# Patient Record
Sex: FEMALE | Race: WHITE
[De-identification: names, ages, dates, MRNs, and addresses within clinical notes are randomized per-mention and may not be internally consistent; named-entity substitution may affect disease eponyms.]

---

## 2018-10-18 ENCOUNTER — HOSPITAL ENCOUNTER (OUTPATIENT)
Dept: HOSPITAL 96 - M.NUC | Age: 53
End: 2018-10-18
Attending: INTERNAL MEDICINE
Payer: COMMERCIAL

## 2018-10-18 DIAGNOSIS — K21.9: Primary | ICD-10-CM

## 2019-09-26 ENCOUNTER — HOSPITAL ENCOUNTER (EMERGENCY)
Dept: HOSPITAL 96 - M.ERS | Age: 54
Discharge: HOME | End: 2019-09-26
Payer: COMMERCIAL

## 2019-09-26 VITALS — WEIGHT: 230.01 LBS | BODY MASS INDEX: 36.96 KG/M2 | HEIGHT: 66 IN

## 2019-09-26 VITALS — DIASTOLIC BLOOD PRESSURE: 74 MMHG | SYSTOLIC BLOOD PRESSURE: 128 MMHG

## 2019-09-26 DIAGNOSIS — Z90.49: ICD-10-CM

## 2019-09-26 DIAGNOSIS — E87.6: Primary | ICD-10-CM

## 2019-09-26 DIAGNOSIS — R20.0: ICD-10-CM

## 2019-09-26 DIAGNOSIS — Z85.118: ICD-10-CM

## 2019-09-26 DIAGNOSIS — F32.9: ICD-10-CM

## 2019-09-26 DIAGNOSIS — Z90.710: ICD-10-CM

## 2019-09-26 DIAGNOSIS — F41.9: ICD-10-CM

## 2019-09-26 DIAGNOSIS — R20.2: ICD-10-CM

## 2019-09-26 LAB
ABSOLUTE BASOPHILS: 0.1 THOU/UL (ref 0–0.2)
ABSOLUTE EOSINOPHILS: 0.1 THOU/UL (ref 0–0.7)
ABSOLUTE MONOCYTES: 0.4 THOU/UL (ref 0–1.2)
ALBUMIN SERPL-MCNC: 3.3 G/DL (ref 3.4–5)
ALP SERPL-CCNC: 150 U/L (ref 46–116)
ALT SERPL-CCNC: 24 U/L (ref 30–65)
ANION GAP SERPL CALC-SCNC: 9 MMOL/L (ref 7–16)
AST SERPL-CCNC: 15 U/L (ref 15–37)
BACTERIA-REFLEX: (no result) /HPF
BASOPHILS NFR BLD AUTO: 0.7 %
BENZODIAZ UR-MCNC: POSITIVE UG/L
BILIRUB SERPL-MCNC: 0.2 MG/DL
BILIRUB UR-MCNC: NEGATIVE MG/DL
BUN SERPL-MCNC: 8 MG/DL (ref 7–18)
CALCIUM SERPL-MCNC: 9 MG/DL (ref 8.5–10.1)
CHLORIDE SERPL-SCNC: 101 MMOL/L (ref 98–107)
CO2 SERPL-SCNC: 27 MMOL/L (ref 21–32)
COLOR UR: YELLOW
CREAT SERPL-MCNC: 1.1 MG/DL (ref 0.6–1.3)
EOSINOPHIL NFR BLD: 1 %
GLUCOSE SERPL-MCNC: 119 MG/DL (ref 70–99)
GRANULOCYTES NFR BLD MANUAL: 73.6 %
HCT VFR BLD CALC: 38.4 % (ref 37–47)
HGB BLD-MCNC: 13.2 GM/DL (ref 12–15)
HYALINE CASTS #/AREA URNS LPF: (no result) /LPF
KETONES UR STRIP-MCNC: NEGATIVE MG/DL
LYMPHOCYTES # BLD: 1.3 THOU/UL (ref 0.8–5.3)
LYMPHOCYTES NFR BLD AUTO: 18.7 %
MAGNESIUM SERPL-MCNC: 1.8 MG/DL (ref 1.8–2.4)
MCH RBC QN AUTO: 30.1 PG (ref 26–34)
MCHC RBC AUTO-ENTMCNC: 34.2 G/DL (ref 28–37)
MCV RBC: 87.9 FL (ref 80–100)
MONOCYTES NFR BLD: 6 %
MPV: 8.3 FL. (ref 7.2–11.1)
MUCUS: (no result) STRN/LPF
NEUTROPHILS # BLD: 5.2 THOU/UL (ref 1.6–8.1)
NUCLEATED RBCS: 0 /100WBC
PLATELET COUNT*: 181 THOU/UL (ref 150–400)
POTASSIUM SERPL-SCNC: 2.9 MMOL/L (ref 3.5–5.1)
PROT SERPL-MCNC: 7.3 G/DL (ref 6.4–8.2)
PROT UR QL STRIP: NEGATIVE
RBC # BLD AUTO: 4.37 MIL/UL (ref 4.2–5)
RBC # UR STRIP: (no result) /UL
RBC #/AREA URNS HPF: (no result) /HPF (ref 0–2)
RDW-CV: 15.6 % (ref 10.5–14.5)
SODIUM SERPL-SCNC: 137 MMOL/L (ref 136–145)
SP GR UR STRIP: 1.02 (ref 1–1.03)
SQUAMOUS: (no result) /LPF (ref 0–3)
THC: POSITIVE
URINE CLARITY: CLEAR
URINE GLUCOSE-RANDOM: NEGATIVE
URINE LEUKOCYTES-REFLEX: NEGATIVE
URINE NITRITE-REFLEX: NEGATIVE
URINE WBC-REFLEX: (no result) /HPF (ref 0–5)
UROBILINOGEN UR STRIP-ACNC: 0.2 E.U./DL (ref 0.2–1)
WBC # BLD AUTO: 7 THOU/UL (ref 4–11)

## 2019-09-27 NOTE — EKG
[place] [address]
Phone:  [phone]                     ELECTROCARDIOGRAM REPORT      
_______________________________________________________________________________
 
Name:       SANTOS MCFARLANE                    Room:                      [facility]#:  [id number]      Account #:      [id number]  
Admission:  19     Attend Phys:                         
Discharge:  19     Date of Birth:  [birth year]  
         Report #: [id number]
    [id number]
_______________________________________________________________________________
THIS REPORT FOR:  //name//                      
 
                         [facility] ED
                                       
Test Date:    2019               Test Time:    17:06:56
Pat Name:     SANTOS MCFARLANE                Department:   
Patient ID:   [id number]            Room:          
Gender:       F                        Technician:   MEGAN
:          1965               Requested By: Carrie Mclean
Order Number: [id number] MD:   South Shi
                                 Measurements
Intervals                              Axis          
Rate:         98                       P:            38
WV:           167                      QRS:          -15
QRSD:         99                       T:            62
QT:           369                                    
QTc:          472                                    
                           Interpretive Statements
Sinus rhythm
Probable left atrial enlargement
Borderline left axis deviation
Low voltage, precordial leads
Abnormal R-wave progression, early transition
No previous ECG available for comparison
 
Electronically Signed On 2019 10:04:49 CDT by South Shi
[email or web address]
 
 
 
 
 
 
 
 
 
 
 
 
 
 
 
 
  <ELECTRONICALLY SIGNED>
                                           By: South Shi MD, [facility]      
  19     1004
D: 19   _____________________________________
T: 19   South Shi MD, [facility]        /EPI

## 2020-06-23 ENCOUNTER — HOSPITAL ENCOUNTER (OUTPATIENT)
Dept: HOSPITAL 96 - M.ERS | Age: 55
Setting detail: OBSERVATION
LOS: 1 days | Discharge: HOME | End: 2020-06-24
Attending: INTERNAL MEDICINE | Admitting: INTERNAL MEDICINE
Payer: COMMERCIAL

## 2020-06-23 VITALS — SYSTOLIC BLOOD PRESSURE: 126 MMHG | DIASTOLIC BLOOD PRESSURE: 78 MMHG

## 2020-06-23 VITALS — DIASTOLIC BLOOD PRESSURE: 71 MMHG | SYSTOLIC BLOOD PRESSURE: 109 MMHG

## 2020-06-23 VITALS — SYSTOLIC BLOOD PRESSURE: 131 MMHG | DIASTOLIC BLOOD PRESSURE: 94 MMHG

## 2020-06-23 VITALS — HEIGHT: 65.98 IN | BODY MASS INDEX: 32.14 KG/M2 | WEIGHT: 200 LBS

## 2020-06-23 DIAGNOSIS — R42: ICD-10-CM

## 2020-06-23 DIAGNOSIS — I49.9: Primary | ICD-10-CM

## 2020-06-23 DIAGNOSIS — F32.9: ICD-10-CM

## 2020-06-23 DIAGNOSIS — I48.91: ICD-10-CM

## 2020-06-23 DIAGNOSIS — F41.9: ICD-10-CM

## 2020-06-23 DIAGNOSIS — I10: ICD-10-CM

## 2020-06-23 LAB
ABSOLUTE BASOPHILS: 0.1 THOU/UL (ref 0–0.2)
ABSOLUTE EOSINOPHILS: 0.1 THOU/UL (ref 0–0.7)
ABSOLUTE MONOCYTES: 0.3 THOU/UL (ref 0–1.2)
ALBUMIN SERPL-MCNC: 3.4 G/DL (ref 3.4–5)
ALP SERPL-CCNC: 108 U/L (ref 46–116)
ALT SERPL-CCNC: 19 U/L (ref 30–65)
ANION GAP SERPL CALC-SCNC: 8 MMOL/L (ref 7–16)
APTT BLD: 25.9 SECONDS (ref 25–31.3)
AST SERPL-CCNC: 15 U/L (ref 15–37)
BACTERIA-REFLEX: (no result) /HPF
BASOPHILS NFR BLD AUTO: 1.2 %
BILIRUB SERPL-MCNC: 0.4 MG/DL
BILIRUB UR-MCNC: NEGATIVE MG/DL
BUN SERPL-MCNC: 11 MG/DL (ref 7–18)
CALCIUM SERPL-MCNC: 8.9 MG/DL (ref 8.5–10.1)
CHLORIDE SERPL-SCNC: 102 MMOL/L (ref 98–107)
CO2 SERPL-SCNC: 28 MMOL/L (ref 21–32)
COLOR UR: YELLOW
CREAT SERPL-MCNC: 1 MG/DL (ref 0.6–1.3)
EOSINOPHIL NFR BLD: 2 %
GLUCOSE SERPL-MCNC: 114 MG/DL (ref 70–99)
GRANULOCYTES NFR BLD MANUAL: 53.7 %
HCT VFR BLD CALC: 44.6 % (ref 37–47)
HGB BLD-MCNC: 15.6 GM/DL (ref 12–15)
INR PPP: 1
KETONES UR STRIP-MCNC: NEGATIVE MG/DL
LYMPHOCYTES # BLD: 1.6 THOU/UL (ref 0.8–5.3)
LYMPHOCYTES NFR BLD AUTO: 36.4 %
MCH RBC QN AUTO: 35.2 PG (ref 26–34)
MCHC RBC AUTO-ENTMCNC: 34.9 G/DL (ref 28–37)
MCV RBC: 100.6 FL (ref 80–100)
MONOCYTES NFR BLD: 6.7 %
MPV: 8.2 FL. (ref 7.2–11.1)
NEUTROPHILS # BLD: 2.4 THOU/UL (ref 1.6–8.1)
NT-PRO BRAIN NAT PEPTIDE: 40 PG/ML (ref ?–300)
NUCLEATED RBCS: 0 /100WBC
PLATELET COUNT*: 136 THOU/UL (ref 150–400)
POTASSIUM SERPL-SCNC: 3 MMOL/L (ref 3.5–5.1)
PROT SERPL-MCNC: 6.7 G/DL (ref 6.4–8.2)
PROT UR QL STRIP: NEGATIVE
PROTHROMBIN TIME: 10.7 SECONDS (ref 9.2–11.5)
RBC # BLD AUTO: 4.43 MIL/UL (ref 4.2–5)
RBC # UR STRIP: (no result) /UL
RBC #/AREA URNS HPF: (no result) /HPF (ref 0–2)
RDW-CV: 15.8 % (ref 10.5–14.5)
SODIUM SERPL-SCNC: 138 MMOL/L (ref 136–145)
SP GR UR STRIP: 1.02 (ref 1–1.03)
SQUAMOUS: (no result) /LPF (ref 0–3)
URINE CLARITY: CLEAR
URINE GLUCOSE-RANDOM: NEGATIVE
URINE LEUKOCYTES-REFLEX: NEGATIVE
URINE NITRITE-REFLEX: NEGATIVE
URINE WBC-REFLEX: (no result) /HPF (ref 0–5)
UROBILINOGEN UR STRIP-ACNC: 1 E.U./DL (ref 0.2–1)
WBC # BLD AUTO: 4.4 THOU/UL (ref 4–11)

## 2020-06-24 VITALS — DIASTOLIC BLOOD PRESSURE: 73 MMHG | SYSTOLIC BLOOD PRESSURE: 114 MMHG

## 2020-06-24 VITALS — DIASTOLIC BLOOD PRESSURE: 84 MMHG | SYSTOLIC BLOOD PRESSURE: 127 MMHG

## 2020-06-24 VITALS — DIASTOLIC BLOOD PRESSURE: 67 MMHG | SYSTOLIC BLOOD PRESSURE: 116 MMHG

## 2020-06-24 VITALS — DIASTOLIC BLOOD PRESSURE: 74 MMHG | SYSTOLIC BLOOD PRESSURE: 105 MMHG

## 2020-06-24 VITALS — DIASTOLIC BLOOD PRESSURE: 76 MMHG | SYSTOLIC BLOOD PRESSURE: 130 MMHG

## 2020-06-24 VITALS — DIASTOLIC BLOOD PRESSURE: 51 MMHG | SYSTOLIC BLOOD PRESSURE: 73 MMHG

## 2020-06-24 VITALS — SYSTOLIC BLOOD PRESSURE: 105 MMHG | DIASTOLIC BLOOD PRESSURE: 81 MMHG

## 2020-06-24 VITALS — SYSTOLIC BLOOD PRESSURE: 118 MMHG | DIASTOLIC BLOOD PRESSURE: 74 MMHG

## 2020-06-24 LAB
ABSOLUTE BASOPHILS: 0 THOU/UL (ref 0–0.2)
ABSOLUTE EOSINOPHILS: 0 THOU/UL (ref 0–0.7)
ABSOLUTE MONOCYTES: 0.4 THOU/UL (ref 0–1.2)
ALBUMIN SERPL-MCNC: 3.2 G/DL (ref 3.4–5)
ALP SERPL-CCNC: 104 U/L (ref 46–116)
ALT SERPL-CCNC: 20 U/L (ref 30–65)
ANION GAP SERPL CALC-SCNC: 7 MMOL/L (ref 7–16)
AST SERPL-CCNC: 18 U/L (ref 15–37)
BASOPHILS NFR BLD AUTO: 0.7 %
BILIRUB SERPL-MCNC: 0.5 MG/DL
BUN SERPL-MCNC: 11 MG/DL (ref 7–18)
CALCIUM SERPL-MCNC: 8.3 MG/DL (ref 8.5–10.1)
CHLORIDE SERPL-SCNC: 105 MMOL/L (ref 98–107)
CO2 SERPL-SCNC: 28 MMOL/L (ref 21–32)
CREAT SERPL-MCNC: 0.9 MG/DL (ref 0.6–1.3)
EOSINOPHIL NFR BLD: 1.2 %
GLUCOSE SERPL-MCNC: 103 MG/DL (ref 70–99)
GRANULOCYTES NFR BLD MANUAL: 62.9 %
HCT VFR BLD CALC: 41 % (ref 37–47)
HGB BLD-MCNC: 14.2 GM/DL (ref 12–15)
LYMPHOCYTES # BLD: 1.1 THOU/UL (ref 0.8–5.3)
LYMPHOCYTES NFR BLD AUTO: 25.8 %
MCH RBC QN AUTO: 34.8 PG (ref 26–34)
MCHC RBC AUTO-ENTMCNC: 34.7 G/DL (ref 28–37)
MCV RBC: 100.3 FL (ref 80–100)
MONOCYTES NFR BLD: 9.4 %
MPV: 7.6 FL. (ref 7.2–11.1)
NEUTROPHILS # BLD: 2.6 THOU/UL (ref 1.6–8.1)
NUCLEATED RBCS: 0 /100WBC
PLATELET COUNT*: 118 THOU/UL (ref 150–400)
POTASSIUM SERPL-SCNC: 3.2 MMOL/L (ref 3.5–5.1)
PROT SERPL-MCNC: 6.3 G/DL (ref 6.4–8.2)
RBC # BLD AUTO: 4.09 MIL/UL (ref 4.2–5)
RDW-CV: 15.7 % (ref 10.5–14.5)
SODIUM SERPL-SCNC: 140 MMOL/L (ref 136–145)
WBC # BLD AUTO: 4.1 THOU/UL (ref 4–11)

## 2020-06-24 NOTE — NUR
PT.HAD PHYSICAL THERAPY. THEY RECOMMENDED A FRONT WHEEL WALKER FOR HOME.
PHU/PROVIDER PLUS [employer]. ORDER OBTAINED AND FAXED TO HER. ORIGINAL COPY
FOR [employer] TO DISPENSE A WALKER FROM THEIR CONSIGNMENT CLOSET, IN FRONT OF
CHART.

## 2020-06-24 NOTE — NUR
ASSUMED CARE OF PT AT 2200 FROM THE ER. PT IS ALERT AND ORIENTED. VSS. PERRLA.
NO COMPLAINTS OF PAIN. STEADY GAIT. PT IS IN SINUS RYTHM ON THE TELEMETRY. PT
IS RESTING COMFORTABLY IN BED. RESPIRATIONS ARE EVEN AND NONLABORED. WILL
CONTINUE TO MONITOR PT.

## 2020-06-24 NOTE — 2DMMODE
[place] [address]
Phone:  [phone] 2 D/M-MODE ECHOCARDIOGRAM     
_______________________________________________________________________________
 
Name:         JASMINA MCFARLANEMITZI CARTAGENA                   Room:          [address]
[facility]#:    [id number]     Account #:     [id number]  
Admission:    20    Attend Phys:   Alyssa Peacock, 
Discharge:                Date of Birth: [birth year]  
Date of Service: 20 1341  Report #:      [id number]
        [id number]
_______________________________________________________________________________
THIS REPORT FOR:
 
cc:  Liane Durand,Liane Mcclain,Hans REYNA MD [facility]       
                                                                       ~
 
--------------- APPROVED REPORT --------------
 
 
Study performed:  2020 10:06:59
 
EXAM: Comprehensive 2D, Doppler, and color-flow 
Echocardiogram 
Patient Location: In-Patient   
 
      BSA:         2.00
HR: 94 bpm BP:          114/73 mmHg 
 
Other Information 
Study Quality: Fair
 
Indications
Atrial Fibrillation
 
2D Dimensions
IVSd:  10.63 (7-11mm) LVOT Diam:  19.92 (18-24mm) 
LVDd:  44.24 mm  
PWd:  11.12 (7-11mm) Ascending Ao:  25.64 (22-36mm)
LVDs:  35.22 (25-40mm) 
Aortic Root:  24.91 mm 
 
Volumes
Left Atrial Volume (Systole) 
    LA ESV Index:  19.60 mL/m2
 
Aortic Valve
AoV Peak Manoj.:  1.06 m/s 
AO Peak Gr.:  4.48 mmHg  LVOT Max P.73 mmHg
AO Mean Gr.:  2.43 mmHg  LVOT Mean P.67 mmHg
    LVOT Max V:  0.83 m/s
AO V2 VTI:  20.86 cm  LVOT Mean V:  0.61 m/s
MARY (VTI):  3.10 cm2  LVOT V1 VTI:  20.72 cm
 
Mitral Valve
    E/A Ratio:  1.15
 
 
[place] [address]
Phone:  [phone]                     2 D/M-MODE ECHOCARDIOGRAM     
_______________________________________________________________________________
 
Name:         SANTOS MCFARLANE                   Room:          [address]#:    [id number]     Account #:     [id number]  
Admission:    20    Attend Phys:   Alyssa Peacock, 
Discharge:                Date of Birth: [birth year]  
Date of Service: 20 1341  Report #:      [id number]
        [id number]
_______________________________________________________________________________
    MV Decel. Time:  206.16 ms
MV E Max Manoj.:  0.87 m/s 
MV PHT:  59.79 ms  
MVA (PHT):  3.68 cm2  
 
TDI
E/Lateral E':  9.67 E/Medial E':  9.67
   Medial E' Manoj.:  0.09 m/s
   Lateral E' Manoj.:  0.09 m/s
 
Pulmonary Valve
PV Peak Manoj.:  0.77 m/s PV Peak Gr.:  2.35 mmHg
 
Tricuspid Valve
   RAP Estimate:  5.00 mmHg
TR Peak Gr.:  6.11 mmHg RVSP:  11.11 mmHg
   PA Pressure:  11.11 mmHg
 
Left Ventricle
The left ventricle is normal size. There is normal LV segmental wall 
motion. There is normal left ventricular wall thickness. Left 
ventricular systolic function is normal. The left ventricular 
ejection fraction is within the normal range. LVEF is 60-65%. The 
left ventricular diastolic function is normal.
 
Right Ventricle
The right ventricle is normal size. The right ventricular systolic 
function is normal.
 
Atria
The left atrium size is normal. The right atrium size is 
normal.
 
Aortic Valve
The aortic valve is normal in structure. No aortic regurgitation is 
present. There is no aortic valvular stenosis.
 
Mitral Valve
The mitral valve is normal in structure. There is no mitral valve 
regurgitation noted. No evidence of mitral valve stenosis.
 
Tricuspid Valve
The tricuspid valve is normal in structure. Trace tricuspid 
regurgitation.
 
Pulmonic Valve
 
 
[place] [address]
Phone:  [phone]                     2 D/M-MODE ECHOCARDIOGRAM     
_______________________________________________________________________________
 
Name:         SANTOS MCFARLANE                   Room:          [address]#:    [id number]     Account #:     [id number]  
Admission:    20    Attend Phys:   Alyssa Peacock, 
Discharge:                Date of Birth: [birth year]  
Date of Service: 20 1341  Report #:      [id number]
        [id number]
_______________________________________________________________________________
The pulmonary valve is normal in structure. There is no pulmonic 
valvular regurgitation.
 
Great Vessels
The aortic root is normal in size. IVC is normal in size and 
collapses >50% with inspiration.
 
Pericardium
There is no pericardial effusion.
 
<Conclusion>
The left ventricle is normal size.
There is normal left ventricular wall thickness.
Left ventricular systolic function is normal.
The left ventricular ejection fraction is within the normal 
range.
LVEF is 60-65%.
The left ventricular diastolic function is normal.
The right ventricle is normal size.
The left atrium size is normal.
The aortic valve is normal in structure.
The mitral valve is normal in structure.
The tricuspid valve is normal in structure.
IVC is normal in size and collapses >50% with inspiration.
There is no pericardial effusion.
There is normal LV segmental wall motion.
 
 
 
 
 
 
 
 
 
 
 
 
 
 
 
 
 
 
  <ELECTRONICALLY SIGNED>
                                           By: Hans Watts MD, FACC     
  20     1341
D: 20 1341   _____________________________________
T: 20 1341   Hans Watts MD, FACC       /INF

## 2020-06-24 NOTE — NUR
ASSUMED CARE OF PT AROUND 0730 THIS AM. REFER TO ASSESSMENT. PT IN SR THIS
SHIFT. ECHO OBTAINED. PT GIVEN HEART MONITOR FROM CARDIOLOGY TO TAKE HOME WITH
HER. PT GIVEN IV BOLUS X2 TO CORRECT ORTHOSTATIC HYPOTENSION. PT GIVEN BENITA
HOSE TO WEAR AT HOME. OK WITH DISCHARGE FROM CARDIOLOGY AND HOSPITALIST. PT
GIVEN DC INSTRUCTIONS AND VERBALIZES UNDERSTANDING. AWAITING US CAROTIDS
BEFORE DC. NO OTHER CONCERNS AT THIS TIME. CLWR. WCTM.

## 2020-06-25 NOTE — EKG
[place] [address]
Phone:  [phone]                     ELECTROCARDIOGRAM REPORT      
_______________________________________________________________________________
 
Name:         SANTOS MCFARLANE                   Room:          [address]#:    [id number]     Account #:     [id number]  
Admission:    20    Attend Phys:   Alyssa Peacock, 
Discharge:    20    Date of Birth: [birth year]  
Date of Service: 20 1701  Report #:      [id number]
        [id number]
_______________________________________________________________________________
THIS REPORT FOR:  //name//                      
 
                         [facility] ED
                                       
Test Date:    2020               Test Time:    17:01:19
Pat Name:     SANTOS MCFARLANE                Department:   
Patient ID:   [id number]            Room:         [facility]
Gender:       F                        Technician:   CCD
:          1965               Requested By: Timothy Taveras
Order Number: [id number] MD:   Juan Flynn
                                 Measurements
Intervals                              Axis          
Rate:         82                       P:            
HI:                                    QRS:          -29
QRSD:         120                      T:            62
QT:           396                                    
QTc:          463                                    
                           Interpretive Statements
Sinus rhythm with premature atrial contractions
Baseline wander in lead(s) II,aVR,aVF
Compared to ECG 2019 17:06:56
Premature atrial contractions now present
Electronically Signed On 2020 8:38:00 CDT by Juan Flynn
[email or web address]
 
 
 
 
 
 
 
 
 
 
 
 
 
 
 
 
 
 
 
 
  <ELECTRONICALLY SIGNED>
                                           By: Juan Flynn MD, FACC   
  20     0838
D: 20 170   _____________________________________
T: 20   Juan Flynn MD, FACC     /EPI

## 2020-06-25 NOTE — EKG
[place] [address]
Phone:  [phone]                     ELECTROCARDIOGRAM REPORT      
_______________________________________________________________________________
 
Name:         SANTOS MCFARLANE                   Room:          [address]#:    [id number]     Account #:     [id number]  
Admission:    20    Attend Phys:   Alyssa Peacock, 
Discharge:    20    Date of Birth: [birth year]  
Date of Service: 20  Report #:      [id number]
        [id number]
_______________________________________________________________________________
THIS REPORT FOR:  //name//                      
 
                         [facility] ED
                                       
Test Date:    2020               Test Time:    17:42:31
Pat Name:     SANTOS MCFARLANE                Department:   
Patient ID:   [id number]            Room:         [facility]
Gender:       F                        Technician:   CCD
:          1965               Requested By: Timothy Taveras
Order Number: [id number] MD:   Juan Flynn
                                 Measurements
Intervals                              Axis          
Rate:         81                       P:            
IL:                                    QRS:          -19
QRSD:         108                      T:            50
QT:           413                                    
QTc:          480                                    
                           Interpretive Statements
Sinus rhythm with premature atrial contractions borderline prolonged QT
interval
Compared to ECG 2020 17:01:19
No significant changes noted
Electronically Signed On 2020 8:38:26 CDT by Juan Flynn
[email or web address]
 
 
 
 
 
 
 
 
 
 
 
 
 
 
 
 
 
 
 
 
  <ELECTRONICALLY SIGNED>
                                           By: Juan Flynn MD, FAC   
  20     0838
D: 20 1742   _____________________________________
T: 20 1742   Juan Flynn MD, [facility]     /EPI

## 2020-06-29 ENCOUNTER — HOSPITAL ENCOUNTER (OUTPATIENT)
Dept: HOSPITAL 96 - M.ERS | Age: 55
Setting detail: OBSERVATION
LOS: 2 days | Discharge: HOME | End: 2020-07-01
Attending: FAMILY MEDICINE | Admitting: FAMILY MEDICINE
Payer: COMMERCIAL

## 2020-06-29 VITALS — HEIGHT: 65.98 IN | WEIGHT: 190 LBS | BODY MASS INDEX: 30.53 KG/M2

## 2020-06-29 VITALS — SYSTOLIC BLOOD PRESSURE: 162 MMHG | DIASTOLIC BLOOD PRESSURE: 100 MMHG

## 2020-06-29 DIAGNOSIS — C34.91: ICD-10-CM

## 2020-06-29 DIAGNOSIS — F41.9: ICD-10-CM

## 2020-06-29 DIAGNOSIS — E83.39: ICD-10-CM

## 2020-06-29 DIAGNOSIS — J44.9: ICD-10-CM

## 2020-06-29 DIAGNOSIS — E83.51: ICD-10-CM

## 2020-06-29 DIAGNOSIS — Z87.891: ICD-10-CM

## 2020-06-29 DIAGNOSIS — B37.9: ICD-10-CM

## 2020-06-29 DIAGNOSIS — E83.42: ICD-10-CM

## 2020-06-29 DIAGNOSIS — Z90.49: ICD-10-CM

## 2020-06-29 DIAGNOSIS — G89.29: ICD-10-CM

## 2020-06-29 DIAGNOSIS — M54.9: ICD-10-CM

## 2020-06-29 DIAGNOSIS — I95.1: ICD-10-CM

## 2020-06-29 DIAGNOSIS — E87.6: Primary | ICD-10-CM

## 2020-06-29 DIAGNOSIS — Z79.899: ICD-10-CM

## 2020-06-29 LAB
ABSOLUTE BASOPHILS: 0 THOU/UL (ref 0–0.2)
ABSOLUTE EOSINOPHILS: 0 THOU/UL (ref 0–0.7)
ABSOLUTE MONOCYTES: 0.6 THOU/UL (ref 0–1.2)
ALBUMIN SERPL-MCNC: 3.1 G/DL (ref 3.4–5)
ALP SERPL-CCNC: 110 U/L (ref 46–116)
ALT SERPL-CCNC: 15 U/L (ref 30–65)
ANION GAP SERPL CALC-SCNC: 6 MMOL/L (ref 7–16)
AST SERPL-CCNC: 12 U/L (ref 15–37)
BACTERIA-REFLEX: (no result) /HPF
BASOPHILS NFR BLD AUTO: 0.5 %
BILIRUB SERPL-MCNC: 0.5 MG/DL
BILIRUB UR-MCNC: NEGATIVE MG/DL
BUN SERPL-MCNC: 7 MG/DL (ref 7–18)
CALCIUM SERPL-MCNC: 8.8 MG/DL (ref 8.5–10.1)
CHLORIDE SERPL-SCNC: 103 MMOL/L (ref 98–107)
CO2 SERPL-SCNC: 31 MMOL/L (ref 21–32)
COLOR UR: YELLOW
CREAT SERPL-MCNC: 1 MG/DL (ref 0.6–1.3)
EOSINOPHIL NFR BLD: 0.6 %
GLUCOSE SERPL-MCNC: 133 MG/DL (ref 70–99)
GRANULOCYTES NFR BLD MANUAL: 73.4 %
HCT VFR BLD CALC: 41.1 % (ref 37–47)
HGB BLD-MCNC: 14.2 GM/DL (ref 12–15)
KETONES UR STRIP-MCNC: NEGATIVE MG/DL
LYMPHOCYTES # BLD: 1 THOU/UL (ref 0.8–5.3)
LYMPHOCYTES NFR BLD AUTO: 16.1 %
MAGNESIUM SERPL-MCNC: 1.4 MG/DL (ref 1.8–2.4)
MCH RBC QN AUTO: 35.2 PG (ref 26–34)
MCHC RBC AUTO-ENTMCNC: 34.6 G/DL (ref 28–37)
MCV RBC: 101.7 FL (ref 80–100)
MONOCYTES NFR BLD: 9.4 %
MPV: 8.3 FL. (ref 7.2–11.1)
NEUTROPHILS # BLD: 4.4 THOU/UL (ref 1.6–8.1)
NUCLEATED RBCS: 0 /100WBC
PLATELET COUNT*: 109 THOU/UL (ref 150–400)
POTASSIUM SERPL-SCNC: 2.9 MMOL/L (ref 3.5–5.1)
PROT SERPL-MCNC: 6.4 G/DL (ref 6.4–8.2)
PROT UR QL STRIP: NEGATIVE
RBC # BLD AUTO: 4.04 MIL/UL (ref 4.2–5)
RBC # UR STRIP: (no result) /UL
RBC #/AREA URNS HPF: (no result) /HPF (ref 0–2)
RDW-CV: 15.7 % (ref 10.5–14.5)
SODIUM SERPL-SCNC: 140 MMOL/L (ref 136–145)
SP GR UR STRIP: 1.02 (ref 1–1.03)
SQUAMOUS: (no result) /LPF (ref 0–3)
URINE CLARITY: CLEAR
URINE GLUCOSE-RANDOM: NEGATIVE
URINE LEUKOCYTES-REFLEX: NEGATIVE
URINE NITRITE-REFLEX: NEGATIVE
URINE WBC-REFLEX: (no result) /HPF (ref 0–5)
UROBILINOGEN UR STRIP-ACNC: 1 E.U./DL (ref 0.2–1)
WBC # BLD AUTO: 6 THOU/UL (ref 4–11)

## 2020-06-30 VITALS — SYSTOLIC BLOOD PRESSURE: 129 MMHG | DIASTOLIC BLOOD PRESSURE: 80 MMHG

## 2020-06-30 VITALS — SYSTOLIC BLOOD PRESSURE: 113 MMHG | DIASTOLIC BLOOD PRESSURE: 72 MMHG

## 2020-06-30 VITALS — DIASTOLIC BLOOD PRESSURE: 77 MMHG | SYSTOLIC BLOOD PRESSURE: 122 MMHG

## 2020-06-30 VITALS — SYSTOLIC BLOOD PRESSURE: 122 MMHG | DIASTOLIC BLOOD PRESSURE: 77 MMHG

## 2020-06-30 VITALS — SYSTOLIC BLOOD PRESSURE: 152 MMHG | DIASTOLIC BLOOD PRESSURE: 90 MMHG

## 2020-06-30 VITALS — DIASTOLIC BLOOD PRESSURE: 79 MMHG | SYSTOLIC BLOOD PRESSURE: 138 MMHG

## 2020-06-30 VITALS — SYSTOLIC BLOOD PRESSURE: 114 MMHG | DIASTOLIC BLOOD PRESSURE: 79 MMHG

## 2020-06-30 LAB
ANION GAP SERPL CALC-SCNC: 7 MMOL/L (ref 7–16)
BUN SERPL-MCNC: 6 MG/DL (ref 7–18)
CALCIUM SERPL-MCNC: 7.9 MG/DL (ref 8.5–10.1)
CHLORIDE SERPL-SCNC: 109 MMOL/L (ref 98–107)
CO2 SERPL-SCNC: 26 MMOL/L (ref 21–32)
CREAT SERPL-MCNC: 0.7 MG/DL (ref 0.6–1.3)
GLUCOSE SERPL-MCNC: 92 MG/DL (ref 70–99)
MAGNESIUM SERPL-MCNC: 1.4 MG/DL (ref 1.8–2.4)
POTASSIUM SERPL-SCNC: 3.1 MMOL/L (ref 3.5–5.1)
SODIUM SERPL-SCNC: 142 MMOL/L (ref 136–145)
URINE CHLORIDE-RANDOM*: 170 MMOL/L
URINE POTASSIUM-RANDOM: 36.8 MMOL/L

## 2020-06-30 NOTE — EKG
[place] [address]
Phone:  [phone]                     ELECTROCARDIOGRAM REPORT      
_______________________________________________________________________________
 
Name:         MAEVEJASMINAMITZI CARTAGENA                   Room:          [address]#:    [id number]     Account #:     [id number]  
Admission:    20    Attend Phys:   Getachew crews Sa
Discharge:                Date of Birth: [birth year]  
Date of Service: 20  Report #:      [id number]
        [id number]
_______________________________________________________________________________
THIS REPORT FOR:  //name//                      
 
                         [facility] ED
                                       
Test Date:    2020               Test Time:    19:16:45
Pat Name:     SANTOS MCFARLANE                Department:   
Patient ID:   [id number]            Room:         [facility]
Gender:       F                        Technician:   FL
:          1965               Requested By: Carrie Mclean
Order Number: [id number] MD:   Juan Flynn
                                 Measurements
Intervals                              Axis          
Rate:         105                      P:            58
TN:           164                      QRS:          -16
QRSD:         110                      T:            84
QT:           364                                    
QTc:          482                                    
                           Interpretive Statements
Sinus tachycardia
Borderline left axis deviation
Compared to ECG 2020 17:42:31
No significant changes
Electronically Signed On 2020 9:07:29 CDT by Juan Flynn
[email or web address]
 
 
 
 
 
 
 
 
 
 
 
 
 
 
 
 
 
 
 
 
  <ELECTRONICALLY SIGNED>
                                           By: Juan Flynn MD, FACC   
  20
D: 20   _____________________________________
T: 20   Juan Flynn MD, [facility]     /EPI

## 2020-07-01 VITALS — SYSTOLIC BLOOD PRESSURE: 126 MMHG | DIASTOLIC BLOOD PRESSURE: 78 MMHG

## 2020-07-01 VITALS — DIASTOLIC BLOOD PRESSURE: 88 MMHG | SYSTOLIC BLOOD PRESSURE: 136 MMHG

## 2020-07-01 VITALS — DIASTOLIC BLOOD PRESSURE: 67 MMHG | SYSTOLIC BLOOD PRESSURE: 101 MMHG

## 2020-07-01 VITALS — SYSTOLIC BLOOD PRESSURE: 134 MMHG | DIASTOLIC BLOOD PRESSURE: 91 MMHG

## 2020-07-01 VITALS — DIASTOLIC BLOOD PRESSURE: 50 MMHG | SYSTOLIC BLOOD PRESSURE: 114 MMHG

## 2020-07-01 VITALS — SYSTOLIC BLOOD PRESSURE: 136 MMHG | DIASTOLIC BLOOD PRESSURE: 85 MMHG

## 2020-07-01 VITALS — SYSTOLIC BLOOD PRESSURE: 142 MMHG | DIASTOLIC BLOOD PRESSURE: 84 MMHG

## 2020-07-01 VITALS — DIASTOLIC BLOOD PRESSURE: 85 MMHG | SYSTOLIC BLOOD PRESSURE: 126 MMHG

## 2020-07-01 VITALS — SYSTOLIC BLOOD PRESSURE: 129 MMHG | DIASTOLIC BLOOD PRESSURE: 87 MMHG

## 2020-07-01 LAB
ANION GAP SERPL CALC-SCNC: 6 MMOL/L (ref 7–16)
BUN SERPL-MCNC: 5 MG/DL (ref 7–18)
CALCIUM SERPL-MCNC: 8 MG/DL (ref 8.5–10.1)
CHLORIDE SERPL-SCNC: 109 MMOL/L (ref 98–107)
CO2 SERPL-SCNC: 27 MMOL/L (ref 21–32)
CREAT SERPL-MCNC: 0.7 MG/DL (ref 0.6–1.3)
GLUCOSE SERPL-MCNC: 92 MG/DL (ref 70–99)
MAGNESIUM SERPL-MCNC: 1.5 MG/DL (ref 1.8–2.4)
PHOSPHATE SERPL-MCNC: 2.4 MG/DL (ref 2.5–4.9)
POTASSIUM SERPL-SCNC: 3.3 MMOL/L (ref 3.5–5.1)
SODIUM SERPL-SCNC: 142 MMOL/L (ref 136–145)

## 2020-07-03 NOTE — CON
[address]  [address]                    CONSULTATION                  
_______________________________________________________________________________
 
Name:       SANTOS MCFARLANE                    Room:           [address] [place]#:  [id number]      Account #:      [id number]  
Admission:  06/29/20     Attend Phys:    Getachew Mock
Discharge:  07/01/20     Date of Birth:  [birth year]  
         Report #: [id number]
                                                                     [id number]  
_______________________________________________________________________________
THIS REPORT FOR:  //name//                      
 
cc:  Liane Durand Anna S. DO                                                    ~
THIS REPORT FOR:  //name//                      
 
CC: Liane Mo
 
DATE OF SERVICE:  07/01/2020
 
 
REQUESTING PHYSICIAN:  Jesús Walden DO.
 
REASON FOR CONSULTATION:  Hypokalemia, which is a recurrent problem.
 
HISTORY OF PRESENT ILLNESS:  The patient is a 55-year-old female with medical
history significant for orthostatic hypotension, chronic atrial fibrillation,
depression, history of COPD, history of lung cancer, restless legs syndrome, she
presents with complaints of weakness.  She was found to have recurrent syncopal
episode and recurrent hypokalemia.  Potassium is around 2.9-3.0.
 
MEDICATIONS:  At home reviewed.  She is not on any medication that can cause
hypokalemia.
 
FAMILY HISTORY:  Negative for hypokalemia.
 
SOCIAL HISTORY:  Former user of tobacco, current use of marijuana.  No alcohol
abuse.
 
REVIEW OF SYSTEMS:  Positive for overall weakness, poor appetite, depression,
frequent dizziness.
 
PAST SURGICAL HISTORY:  Partial right pneumonectomy due to lung cancer.  There
is no evidence of residual mass or pneumonia or pneumothorax.
 
PHYSICAL EXAMINATION:
GENERAL:  Awake, alert, oriented, in no acute distress.
VITAL SIGNS:  Blood pressure 101/67, heart rate is 95, afebrile.
HEENT:  Pupils are round.
NECK:  Fatty.
LUNGS:  Clear.
CARDIOVASCULAR:  Regular rate.
ABDOMEN:  Soft.
LOWER EXTREMITIES:  No edema.
 
 
 
 
[place] [address]                    CONSULTATION                  
_______________________________________________________________________________
 
Name:       SANTOS MCFARLANE MITZI                    Room:           [address] [place]#:  [id number]      Account #:      [id number]  
Admission:  06/29/20     Attend Phys:    Getachew guzmán los Santo
Discharge:  07/01/20     Date of Birth:  [birth year]  
         Report #: [id number]
                                                                     [id number]  
_______________________________________________________________________________
LABORATORY DATA:  Serum sodium 142, potassium 3.3, chloride 109, carbon dioxide
27, BUN 5, creatinine 0.7, magnesium 1.5, phosphorus 2.4, calcium 8.0.
 
ASSESSMENT:  Hypokalemia, coupled with hypocalcemia, hypophosphatemia and
hypomagnesemia.  Most likely, I think this is due to poor nutrition.  Cannot
rule out loss through the urine.
 
PLAN:  My plan is to collect a 24-hour urine for total potassium.  If her
potassium is high in the urine while it is low in the blood that would point
towards renal potassium waste.  So plan again is a 24-hour urine collection and
will follow on potassium.  Make sure we replace potassium, replace magnesium per
protocol for now.
 
 
 
 
 
 
 
 
 
 
 
 
 
 
 
 
 
 
 
 
 
 
 
 
 
 
 
 
 
 
 
 
<ELECTRONICALLY SIGNED>
                                        By:  Vivek Perkins MD      
07/03/20     1017
D: 07/01/20 1529_______________________________________
T: 07/01/20 1952Alexlivier Perkins MD         /nt

## 2020-07-08 ENCOUNTER — HOSPITAL ENCOUNTER (EMERGENCY)
Dept: HOSPITAL 96 - M.ERS | Age: 55
Discharge: HOME | End: 2020-07-08
Payer: COMMERCIAL

## 2020-07-08 VITALS — DIASTOLIC BLOOD PRESSURE: 69 MMHG | SYSTOLIC BLOOD PRESSURE: 100 MMHG

## 2020-07-08 VITALS — HEIGHT: 66 IN | WEIGHT: 195 LBS | BODY MASS INDEX: 31.34 KG/M2

## 2020-07-08 DIAGNOSIS — Z90.710: ICD-10-CM

## 2020-07-08 DIAGNOSIS — E86.0: Primary | ICD-10-CM

## 2020-07-08 DIAGNOSIS — Z85.118: ICD-10-CM

## 2020-07-08 DIAGNOSIS — Z98.51: ICD-10-CM

## 2020-07-08 DIAGNOSIS — Z90.49: ICD-10-CM

## 2020-07-08 DIAGNOSIS — R33.9: ICD-10-CM

## 2020-07-08 DIAGNOSIS — Z96.652: ICD-10-CM

## 2020-07-08 LAB
BILIRUB UR-MCNC: NEGATIVE MG/DL
COLOR UR: YELLOW
KETONES UR STRIP-MCNC: NEGATIVE MG/DL
PROT UR QL STRIP: (no result)
RBC # UR STRIP: (no result) /UL
SP GR UR STRIP: >= 1.03 (ref 1–1.03)
URINE CLARITY: CLEAR
URINE GLUCOSE-RANDOM: NEGATIVE
URINE LEUKOCYTES-REFLEX: NEGATIVE
URINE NITRITE-REFLEX: NEGATIVE
UROBILINOGEN UR STRIP-ACNC: 1 E.U./DL (ref 0.2–1)

## 2020-07-13 ENCOUNTER — HOSPITAL ENCOUNTER (OUTPATIENT)
Dept: HOSPITAL 96 - M.ERS | Age: 55
Setting detail: OBSERVATION
LOS: 2 days | Discharge: HOME | End: 2020-07-15
Attending: INTERNAL MEDICINE | Admitting: INTERNAL MEDICINE
Payer: COMMERCIAL

## 2020-07-13 VITALS — WEIGHT: 245 LBS | HEIGHT: 67.01 IN | BODY MASS INDEX: 38.45 KG/M2

## 2020-07-13 DIAGNOSIS — F41.1: ICD-10-CM

## 2020-07-13 DIAGNOSIS — G62.9: ICD-10-CM

## 2020-07-13 DIAGNOSIS — Z85.118: ICD-10-CM

## 2020-07-13 DIAGNOSIS — Z03.818: Primary | ICD-10-CM

## 2020-07-13 DIAGNOSIS — J44.9: ICD-10-CM

## 2020-07-13 DIAGNOSIS — G25.81: ICD-10-CM

## 2020-07-13 DIAGNOSIS — G47.00: ICD-10-CM

## 2020-07-13 DIAGNOSIS — E87.6: ICD-10-CM

## 2020-07-13 DIAGNOSIS — R55: ICD-10-CM

## 2020-07-13 DIAGNOSIS — F32.9: ICD-10-CM

## 2020-07-13 DIAGNOSIS — K31.84: ICD-10-CM

## 2020-07-14 VITALS — SYSTOLIC BLOOD PRESSURE: 113 MMHG | DIASTOLIC BLOOD PRESSURE: 81 MMHG

## 2020-07-14 VITALS — DIASTOLIC BLOOD PRESSURE: 82 MMHG | SYSTOLIC BLOOD PRESSURE: 118 MMHG

## 2020-07-14 VITALS — DIASTOLIC BLOOD PRESSURE: 88 MMHG | SYSTOLIC BLOOD PRESSURE: 127 MMHG

## 2020-07-14 VITALS — DIASTOLIC BLOOD PRESSURE: 81 MMHG | SYSTOLIC BLOOD PRESSURE: 138 MMHG

## 2020-07-14 VITALS — DIASTOLIC BLOOD PRESSURE: 76 MMHG | SYSTOLIC BLOOD PRESSURE: 103 MMHG

## 2020-07-14 VITALS — DIASTOLIC BLOOD PRESSURE: 88 MMHG | SYSTOLIC BLOOD PRESSURE: 123 MMHG

## 2020-07-14 VITALS — DIASTOLIC BLOOD PRESSURE: 86 MMHG | SYSTOLIC BLOOD PRESSURE: 129 MMHG

## 2020-07-14 LAB
ABSOLUTE BASOPHILS: 0 THOU/UL (ref 0–0.2)
ABSOLUTE EOSINOPHILS: 0.1 THOU/UL (ref 0–0.7)
ABSOLUTE MONOCYTES: 0.4 THOU/UL (ref 0–1.2)
ALBUMIN SERPL-MCNC: 3 G/DL (ref 3.4–5)
ALP SERPL-CCNC: 147 U/L (ref 46–116)
ALT SERPL-CCNC: 30 U/L (ref 30–65)
ANION GAP SERPL CALC-SCNC: 6 MMOL/L (ref 7–16)
ANION GAP SERPL CALC-SCNC: 6 MMOL/L (ref 7–16)
AST SERPL-CCNC: 32 U/L (ref 15–37)
BASOPHILS NFR BLD AUTO: 0.6 %
BILIRUB SERPL-MCNC: 0.4 MG/DL
BILIRUB UR-MCNC: NEGATIVE MG/DL
BUN SERPL-MCNC: 13 MG/DL (ref 7–18)
BUN SERPL-MCNC: 14 MG/DL (ref 7–18)
CALCIUM SERPL-MCNC: 7.9 MG/DL (ref 8.5–10.1)
CALCIUM SERPL-MCNC: 8.4 MG/DL (ref 8.5–10.1)
CHLORIDE SERPL-SCNC: 100 MMOL/L (ref 98–107)
CHLORIDE SERPL-SCNC: 106 MMOL/L (ref 98–107)
CO2 SERPL-SCNC: 27 MMOL/L (ref 21–32)
CO2 SERPL-SCNC: 28 MMOL/L (ref 21–32)
COLOR UR: YELLOW
CREAT SERPL-MCNC: 0.8 MG/DL (ref 0.6–1.3)
CREAT SERPL-MCNC: 1.1 MG/DL (ref 0.6–1.3)
EOSINOPHIL NFR BLD: 1.5 %
GLUCOSE SERPL-MCNC: 149 MG/DL (ref 70–99)
GLUCOSE SERPL-MCNC: 90 MG/DL (ref 70–99)
GRANULOCYTES NFR BLD MANUAL: 60.6 %
HCT VFR BLD CALC: 42.5 % (ref 37–47)
HGB BLD-MCNC: 14.7 GM/DL (ref 12–15)
KETONES UR STRIP-MCNC: NEGATIVE MG/DL
LYMPHOCYTES # BLD: 1.3 THOU/UL (ref 0.8–5.3)
LYMPHOCYTES NFR BLD AUTO: 28.3 %
MAGNESIUM SERPL-MCNC: 1.8 MG/DL (ref 1.8–2.4)
MAGNESIUM SERPL-MCNC: 2 MG/DL (ref 1.8–2.4)
MCH RBC QN AUTO: 35.1 PG (ref 26–34)
MCHC RBC AUTO-ENTMCNC: 34.7 G/DL (ref 28–37)
MCV RBC: 101.2 FL (ref 80–100)
MONOCYTES NFR BLD: 9 %
MPV: 7.7 FL. (ref 7.2–11.1)
NEUTROPHILS # BLD: 2.8 THOU/UL (ref 1.6–8.1)
NUCLEATED RBCS: 0 /100WBC
PLATELET COUNT*: 128 THOU/UL (ref 150–400)
POTASSIUM SERPL-SCNC: 3.3 MMOL/L (ref 3.5–5.1)
POTASSIUM SERPL-SCNC: 3.6 MMOL/L (ref 3.5–5.1)
PROT SERPL-MCNC: 6.1 G/DL (ref 6.4–8.2)
PROT UR QL STRIP: NEGATIVE
RBC # BLD AUTO: 4.2 MIL/UL (ref 4.2–5)
RBC # UR STRIP: (no result) /UL
RDW-CV: 15.6 % (ref 10.5–14.5)
SODIUM SERPL-SCNC: 133 MMOL/L (ref 136–145)
SODIUM SERPL-SCNC: 140 MMOL/L (ref 136–145)
SP GR UR STRIP: 1.02 (ref 1–1.03)
URINE CLARITY: CLEAR
URINE GLUCOSE-RANDOM: NEGATIVE
URINE LEUKOCYTES-REFLEX: NEGATIVE
URINE NITRITE-REFLEX: NEGATIVE
UROBILINOGEN UR STRIP-ACNC: 1 E.U./DL (ref 0.2–1)
WBC # BLD AUTO: 4.7 THOU/UL (ref 4–11)

## 2020-07-14 NOTE — [place] [address]
Phone:  [phone]                     ELECTROCARDIOGRAM REPORT      
_______________________________________________________________________________
 
Name:         SANTOS MCFARLANE                   Room:          [address]#:    [id number]     Account #:     [id number]  
Admission:    20    Attend Phys:   Alyssa Peacock, 
Discharge:                Date of Birth: [birth year]  
Date of Service: 20 0007  Report #:      [id number]
        [id number]
_______________________________________________________________________________
THIS REPORT FOR:  //name//                      
 
                         [facility] ED
                                       
Test Date:    2020               Test Time:    00:07:38
Pat Name:     SANTOS MCFARLANE                Department:   
Patient ID:   [id number]            Room:         [facility]
Gender:       F                        Technician:   
:          1965               Requested By: Daisy Sexton
Order Number: [id number] MD:   South Shi
                                 Measurements
Intervals                              Axis          
Rate:         126                      P:            45
OH:           153                      QRS:          13
QRSD:         102                      T:            163
QT:           355                                    
QTc:          515                                    
                           Interpretive Statements
Sinus tachycardia
Probable left atrial enlargement
RSR' in V1 or V2, right VCD or RVH
Nonspecific T abnrm, anterolateral leads
Prolonged QT interval
Compared to ECG 2020 19:16:45
 
RSR' in V1 or V2 now present
Prolonged QT interval now present
Electronically Signed On 2020 13:26:49 CDT by South Shi
[email or web address]
 
 
 
 
 
 
 
 
 
 
 
 
 
 
 
  <ELECTRONICALLY SIGNED>
                                           By: South Shi MD, [facility]      
  20     1326
D: 20   _____________________________________
T: 20   South Shi MD, [facility]        /EPI

## 2020-07-14 NOTE — NUR
Pt lives at home with .  Pt has RW.  Pt struggling with anxiety,
medications contributing to symptoms according to the team and nurse says that
she has informed the pt at recent previous admissions.  Psych consulted.  SW
to continue to follow to assist with safe dc planning.

## 2020-07-14 NOTE — NUR
PT ADMITTED TO ROOM 226 AROUND 0825 THIS AM. REFER TO ASSESSMENT. CARDIOLOGY
AND PSYCHOLOGY CONSULTED THIS SHIFT. AWAITING PSYCHOLOGISTS RECOMMENDATIONS AT
THIS TIME. ANTICIPATE TILT TABLE TEST WITH CARDIOLOGY TOMORROW. PT TO BE NPO
AFTER MIDNIGHT. ORTHOSTAT VSS OBTAINED THIS EVENING AND PT POSITIVE. PRN
MIDODRINE ADMINISTERED. US TO CAROTIDS OBTAINED THIS SHIFT. POTASSIUM REPLACED
THIS SHIFT. FALL PRECAUTIONS IN PLACE.  NO OTHER CONCERNS AT THIS TIME. CLWR.
WCTM.

## 2020-07-15 VITALS — SYSTOLIC BLOOD PRESSURE: 124 MMHG | DIASTOLIC BLOOD PRESSURE: 75 MMHG

## 2020-07-15 VITALS — SYSTOLIC BLOOD PRESSURE: 122 MMHG | DIASTOLIC BLOOD PRESSURE: 69 MMHG

## 2020-07-15 VITALS — DIASTOLIC BLOOD PRESSURE: 69 MMHG | SYSTOLIC BLOOD PRESSURE: 122 MMHG

## 2020-07-15 VITALS — DIASTOLIC BLOOD PRESSURE: 55 MMHG | SYSTOLIC BLOOD PRESSURE: 81 MMHG

## 2020-07-15 VITALS — SYSTOLIC BLOOD PRESSURE: 139 MMHG | DIASTOLIC BLOOD PRESSURE: 80 MMHG

## 2020-07-15 VITALS — DIASTOLIC BLOOD PRESSURE: 88 MMHG | SYSTOLIC BLOOD PRESSURE: 137 MMHG

## 2020-07-15 VITALS — DIASTOLIC BLOOD PRESSURE: 85 MMHG | SYSTOLIC BLOOD PRESSURE: 112 MMHG

## 2020-07-15 VITALS — SYSTOLIC BLOOD PRESSURE: 104 MMHG | DIASTOLIC BLOOD PRESSURE: 76 MMHG

## 2020-07-15 VITALS — DIASTOLIC BLOOD PRESSURE: 81 MMHG | SYSTOLIC BLOOD PRESSURE: 132 MMHG

## 2020-07-15 VITALS — DIASTOLIC BLOOD PRESSURE: 66 MMHG | SYSTOLIC BLOOD PRESSURE: 135 MMHG

## 2020-07-15 VITALS — SYSTOLIC BLOOD PRESSURE: 103 MMHG | DIASTOLIC BLOOD PRESSURE: 74 MMHG

## 2020-07-15 VITALS — SYSTOLIC BLOOD PRESSURE: 131 MMHG | DIASTOLIC BLOOD PRESSURE: 88 MMHG

## 2020-07-15 VITALS — DIASTOLIC BLOOD PRESSURE: 84 MMHG | SYSTOLIC BLOOD PRESSURE: 115 MMHG

## 2020-07-15 VITALS — DIASTOLIC BLOOD PRESSURE: 52 MMHG | SYSTOLIC BLOOD PRESSURE: 82 MMHG

## 2020-07-15 VITALS — SYSTOLIC BLOOD PRESSURE: 123 MMHG | DIASTOLIC BLOOD PRESSURE: 90 MMHG

## 2020-07-15 NOTE — NUR
PATIENT NPO FOR TILT TABLE TEST THIS AM, RESULTS SENT TO CARDIOLOGY AND AMANDA HOOPER HERE TO SEE PATIENT. PATIENT OK TO DISCHARGE HOME, FIRST DOSE OF
INCREASED MIDODRINE GIVEN AS ORDERED. DR. STEPHEN NOTIFIED AND OK TO
DISCHARGE. IV DC'D. PATIENT UP WITH ASSISTANCE THIS SHIFT. PRN HYDROCODONE
GIVEN FOR BACK PAIN X 1. ORTHOSTATICS THIS AM WERE GIVEN TO DR. STEPHEN.
VERBALIZES UNDERSTANDING OF PAPERWORK AND SCRIPT. PATIENT TAKEN OUT VIA
WHEELCHAIR.

## 2020-07-15 NOTE — NUR
Assumed pt care at approx 1930. Pt is awake and oriented x4. Cardiac monitor
in place and is tracing SR. Pt is not in any distress. Pt c/o back pain
relieved by pain meds given per MAR. Orthostatic BP is positive. Pt said she
is  not as dizzy as she has been. No acute changes overnight. Pt is advised to
have nothing by mouth after midnight. Call light within reach. Hourly rounding
done for pt safety. High fall precautions in place.

## 2020-07-17 NOTE — PROC
[address]  [address]                    PROCEDURE REPORT              
_______________________________________________________________________________
 
Name:       SANTOS MCFARLANE                    Room:           [address] [place]#:  [id number]      Account #:      [id number]  
Admission:  07/14/20     Attend Phys:    Alyssa Peacock MD 
Discharge:  07/15/20     Date of Birth:  [birth year]  
         Report #: [id number]
                                                                     [id number]  
_______________________________________________________________________________
THIS REPORT FOR:  //name//                      
 
cc:  Liane Durand Anna S. DO                                                    
THIS REPORT FOR:  //name//                      
 
CC: Liane Peacock
 
DATE OF SERVICE:  07/15/2020
 
 
HEAD UPRIGHT TILT TABLE TEST
 
TITLE OF PROCEDURE:  Head upright tilt table testing.
 
INDICATIONS:  Head upright tilt-table testing was requested in this patient with
a history of dizziness.
 
PROCEDURE IN DETAIL:  Head upright table testing was performed by placing the
patient in the supine position.  Vital signs were obtained in the resting
position.  ECG monitoring was performed throughout the procedure.
 
RESULTS:  The patient initially had a blood pressure of 103/74 with a pulse of
76.
 
ECG showed a normal sinus rhythm.  The patient was then elevated in the 70
degrees head upright position on the tilt table.  After 5 minutes, the patient
became lightheaded and blood pressure noted to be 93/72.  The patient continued
to complain of lightheadedness and blood pressure noted to be 80/55 with a pulse
of 80 and in sinus rhythm.  Because of the patient's symptoms and hypotensive
findings, the procedure was abandoned.  The patient was placed back into the
supine position.  At this time, blood pressure 134/66, pulse of 90 and she
remained in sinus rhythm.
 
IMPRESSION:
1.  Suspicious head upright tilt-table testing for neurocardiogenic syncope.
2.  The patient did demonstrate an orthostatic hypotensive response to head
upright tilt-table testing.
3.  Because of hypotension, the patient was not administered sublingual
nitroglycerin.
4.  There was no true syncope during the procedure.
 
 
<ELECTRONICALLY SIGNED>
                                        By:  South Shi MD, [facility]      
07/17/20     1443
D: 07/15/20 1827_______________________________________
T: 07/15/20 1905South Shi MD, [facility]         /nt

## 2020-07-19 ENCOUNTER — HOSPITAL ENCOUNTER (EMERGENCY)
Dept: HOSPITAL 96 - M.ERS | Age: 55
Discharge: HOME | End: 2020-07-19
Payer: COMMERCIAL

## 2020-07-19 VITALS — HEIGHT: 66 IN | BODY MASS INDEX: 27.97 KG/M2 | WEIGHT: 174.01 LBS

## 2020-07-19 VITALS — DIASTOLIC BLOOD PRESSURE: 85 MMHG | SYSTOLIC BLOOD PRESSURE: 123 MMHG

## 2020-07-19 DIAGNOSIS — G89.29: ICD-10-CM

## 2020-07-19 DIAGNOSIS — G62.9: ICD-10-CM

## 2020-07-19 DIAGNOSIS — F32.9: ICD-10-CM

## 2020-07-19 DIAGNOSIS — Z85.118: ICD-10-CM

## 2020-07-19 DIAGNOSIS — G25.81: ICD-10-CM

## 2020-07-19 DIAGNOSIS — R10.13: ICD-10-CM

## 2020-07-19 DIAGNOSIS — F41.9: Primary | ICD-10-CM

## 2020-07-19 DIAGNOSIS — Z90.49: ICD-10-CM

## 2020-07-19 DIAGNOSIS — Z90.710: ICD-10-CM

## 2020-07-19 DIAGNOSIS — Z98.51: ICD-10-CM

## 2020-07-19 DIAGNOSIS — R07.89: ICD-10-CM

## 2020-07-19 DIAGNOSIS — Z96.652: ICD-10-CM

## 2020-07-19 DIAGNOSIS — R10.32: ICD-10-CM

## 2020-07-19 LAB
ABSOLUTE BASOPHILS: 0.1 THOU/UL (ref 0–0.2)
ABSOLUTE EOSINOPHILS: 0.1 THOU/UL (ref 0–0.7)
ABSOLUTE MONOCYTES: 0.5 THOU/UL (ref 0–1.2)
ALBUMIN SERPL-MCNC: 3.1 G/DL (ref 3.4–5)
ALP SERPL-CCNC: 111 U/L (ref 46–116)
ALT SERPL-CCNC: 24 U/L (ref 30–65)
ANION GAP SERPL CALC-SCNC: 7 MMOL/L (ref 7–16)
AST SERPL-CCNC: 16 U/L (ref 15–37)
BASOPHILS NFR BLD AUTO: 0.8 %
BILIRUB SERPL-MCNC: 0.4 MG/DL
BILIRUB UR-MCNC: NEGATIVE MG/DL
BUN SERPL-MCNC: 9 MG/DL (ref 7–18)
CALCIUM SERPL-MCNC: 9 MG/DL (ref 8.5–10.1)
CHLORIDE SERPL-SCNC: 102 MMOL/L (ref 98–107)
CO2 SERPL-SCNC: 29 MMOL/L (ref 21–32)
COLOR UR: YELLOW
CREAT SERPL-MCNC: 1 MG/DL (ref 0.6–1.3)
EOSINOPHIL NFR BLD: 1.8 %
GLUCOSE SERPL-MCNC: 104 MG/DL (ref 70–99)
GRANULOCYTES NFR BLD MANUAL: 58.7 %
HCT VFR BLD CALC: 43.4 % (ref 37–47)
HGB BLD-MCNC: 15 GM/DL (ref 12–15)
INR PPP: 1.1
KETONES UR STRIP-MCNC: NEGATIVE MG/DL
LYMPHOCYTES # BLD: 2.1 THOU/UL (ref 0.8–5.3)
LYMPHOCYTES NFR BLD AUTO: 30.8 %
MAGNESIUM SERPL-MCNC: 2 MG/DL (ref 1.8–2.4)
MCH RBC QN AUTO: 35.3 PG (ref 26–34)
MCHC RBC AUTO-ENTMCNC: 34.7 G/DL (ref 28–37)
MCV RBC: 101.9 FL (ref 80–100)
MONOCYTES NFR BLD: 7.9 %
MPV: 7.8 FL. (ref 7.2–11.1)
NEUTROPHILS # BLD: 4 THOU/UL (ref 1.6–8.1)
NT-PRO BRAIN NAT PEPTIDE: 71 PG/ML (ref ?–300)
NUCLEATED RBCS: 0 /100WBC
PLATELET COUNT*: 153 THOU/UL (ref 150–400)
POTASSIUM SERPL-SCNC: 4 MMOL/L (ref 3.5–5.1)
PROT SERPL-MCNC: 6.3 G/DL (ref 6.4–8.2)
PROT UR QL STRIP: NEGATIVE
PROTHROMBIN TIME: 10.9 SECONDS (ref 9.2–11.5)
RBC # BLD AUTO: 4.25 MIL/UL (ref 4.2–5)
RBC # UR STRIP: NEGATIVE /UL
RDW-CV: 15.9 % (ref 10.5–14.5)
SODIUM SERPL-SCNC: 138 MMOL/L (ref 136–145)
SP GR UR STRIP: 1.02 (ref 1–1.03)
URINE CLARITY: CLEAR
URINE GLUCOSE-RANDOM: NEGATIVE
URINE LEUKOCYTES-REFLEX: NEGATIVE
URINE NITRITE-REFLEX: NEGATIVE
UROBILINOGEN UR STRIP-ACNC: 0.2 E.U./DL (ref 0.2–1)
WBC # BLD AUTO: 6.9 THOU/UL (ref 4–11)

## 2020-07-20 ENCOUNTER — HOSPITAL ENCOUNTER (EMERGENCY)
Dept: HOSPITAL 96 - M.ERS | Age: 55
LOS: 1 days | Discharge: HOME | End: 2020-07-21
Payer: COMMERCIAL

## 2020-07-20 VITALS — WEIGHT: 180.01 LBS | BODY MASS INDEX: 28.25 KG/M2 | HEIGHT: 67 IN

## 2020-07-20 DIAGNOSIS — K31.84: Primary | ICD-10-CM

## 2020-07-20 DIAGNOSIS — Z98.51: ICD-10-CM

## 2020-07-20 DIAGNOSIS — F32.9: ICD-10-CM

## 2020-07-20 DIAGNOSIS — R11.2: ICD-10-CM

## 2020-07-20 DIAGNOSIS — Z90.710: ICD-10-CM

## 2020-07-20 DIAGNOSIS — G89.29: ICD-10-CM

## 2020-07-20 DIAGNOSIS — Z85.118: ICD-10-CM

## 2020-07-20 DIAGNOSIS — G25.81: ICD-10-CM

## 2020-07-20 DIAGNOSIS — Z90.49: ICD-10-CM

## 2020-07-20 DIAGNOSIS — G62.9: ICD-10-CM

## 2020-07-20 DIAGNOSIS — F41.9: ICD-10-CM

## 2020-07-20 DIAGNOSIS — Z96.652: ICD-10-CM

## 2020-07-20 LAB
ABSOLUTE BASOPHILS: 0 THOU/UL (ref 0–0.2)
ABSOLUTE EOSINOPHILS: 0.1 THOU/UL (ref 0–0.7)
ABSOLUTE MONOCYTES: 0.4 THOU/UL (ref 0–1.2)
ALBUMIN SERPL-MCNC: 3.5 G/DL (ref 3.4–5)
ALP SERPL-CCNC: 116 U/L (ref 46–116)
ALT SERPL-CCNC: 21 U/L (ref 30–65)
ANION GAP SERPL CALC-SCNC: 10 MMOL/L (ref 7–16)
AST SERPL-CCNC: 21 U/L (ref 15–37)
BASOPHILS NFR BLD AUTO: 0.6 %
BILIRUB SERPL-MCNC: 0.5 MG/DL
BILIRUB UR-MCNC: NEGATIVE MG/DL
BUN SERPL-MCNC: 13 MG/DL (ref 7–18)
CALCIUM SERPL-MCNC: 9.2 MG/DL (ref 8.5–10.1)
CHLORIDE SERPL-SCNC: 103 MMOL/L (ref 98–107)
CO2 SERPL-SCNC: 27 MMOL/L (ref 21–32)
COLOR UR: YELLOW
CREAT SERPL-MCNC: 1 MG/DL (ref 0.6–1.3)
EOSINOPHIL NFR BLD: 1 %
GLUCOSE SERPL-MCNC: 110 MG/DL (ref 70–99)
GRANULOCYTES NFR BLD MANUAL: 74.5 %
HCT VFR BLD CALC: 44.9 % (ref 37–47)
HGB BLD-MCNC: 15.4 GM/DL (ref 12–15)
KETONES UR STRIP-MCNC: (no result) MG/DL
LIPASE: 109 U/L (ref 73–393)
LYMPHOCYTES # BLD: 1.1 THOU/UL (ref 0.8–5.3)
LYMPHOCYTES NFR BLD AUTO: 18 %
MAGNESIUM SERPL-MCNC: 2 MG/DL (ref 1.8–2.4)
MCH RBC QN AUTO: 35 PG (ref 26–34)
MCHC RBC AUTO-ENTMCNC: 34.2 G/DL (ref 28–37)
MCV RBC: 102.3 FL (ref 80–100)
MONOCYTES NFR BLD: 5.9 %
MPV: 7.8 FL. (ref 7.2–11.1)
NEUTROPHILS # BLD: 4.6 THOU/UL (ref 1.6–8.1)
NUCLEATED RBCS: 0 /100WBC
PLATELET COUNT*: 149 THOU/UL (ref 150–400)
POTASSIUM SERPL-SCNC: 4 MMOL/L (ref 3.5–5.1)
PROT SERPL-MCNC: 6.7 G/DL (ref 6.4–8.2)
PROT UR QL STRIP: NEGATIVE
RBC # BLD AUTO: 4.39 MIL/UL (ref 4.2–5)
RBC # UR STRIP: (no result) /UL
RDW-CV: 16.2 % (ref 10.5–14.5)
SODIUM SERPL-SCNC: 140 MMOL/L (ref 136–145)
SP GR UR STRIP: 1.02 (ref 1–1.03)
URINE CLARITY: CLEAR
URINE GLUCOSE-RANDOM: NEGATIVE
URINE LEUKOCYTES-REFLEX: NEGATIVE
URINE NITRITE-REFLEX: NEGATIVE
UROBILINOGEN UR STRIP-ACNC: 0.2 E.U./DL (ref 0.2–1)
WBC # BLD AUTO: 6.2 THOU/UL (ref 4–11)

## 2020-07-20 NOTE — EKG
[place] [address]
Phone:  [phone]                     ELECTROCARDIOGRAM REPORT      
_______________________________________________________________________________
 
Name:         SANTOS MCFARLANE                   Room:                     [facility]#:    [id number]     Account #:     [id number]  
Admission:    20    Attend Phys:                     
Discharge:    20    Date of Birth: [birth year]  
Date of Service: 20  Report #:      [id number]
        [id number]
_______________________________________________________________________________
THIS REPORT FOR:  //name//                      
 
                         [facility] ED
                                       
Test Date:    2020               Test Time:    19:39:58
Pat Name:     SANTOS MCFARLANE                Department:   
Patient ID:   [id number]            Room:          
Gender:                               Technician:   
:          1965               Requested By: Daisy Sexton
Order Number: [id number] MD:   South Shi
                                 Measurements
Intervals                              Axis          
Rate:         104                      P:            56
WA:           154                      QRS:          -4
QRSD:         106                      T:            88
QT:           330                                    
QTc:          434                                    
                           Interpretive Statements
Sinus tachycardia
Probable left atrial enlargement
RSR' in V1 or V2, probably normal variant
Compared to ECG 2020 00:07:38
rate has slowed
Prolonged QT interval no longer present
Electronically Signed On 2020 11:21:32 CDT by South Shi
[email or web address]
 
 
 
 
 
 
 
 
 
 
 
 
 
 
 
 
 
 
  <ELECTRONICALLY SIGNED>
                                           By: South Shi MD, FAC      
  20     1121
D: 20 1939   _____________________________________
T: 20 1939   South Shi MD, [facility]        /EPI

## 2020-07-21 VITALS — SYSTOLIC BLOOD PRESSURE: 146 MMHG | DIASTOLIC BLOOD PRESSURE: 95 MMHG

## 2020-07-23 ENCOUNTER — HOSPITAL ENCOUNTER (EMERGENCY)
Dept: HOSPITAL 96 - M.ERS | Age: 55
Discharge: HOME | End: 2020-07-23
Payer: COMMERCIAL

## 2020-07-23 VITALS — BODY MASS INDEX: 28.93 KG/M2 | WEIGHT: 180.01 LBS | HEIGHT: 66 IN

## 2020-07-23 VITALS — SYSTOLIC BLOOD PRESSURE: 161 MMHG | DIASTOLIC BLOOD PRESSURE: 95 MMHG

## 2020-07-23 DIAGNOSIS — I95.1: ICD-10-CM

## 2020-07-23 DIAGNOSIS — Z90.49: ICD-10-CM

## 2020-07-23 DIAGNOSIS — Z98.51: ICD-10-CM

## 2020-07-23 DIAGNOSIS — Z85.118: ICD-10-CM

## 2020-07-23 DIAGNOSIS — F41.9: ICD-10-CM
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
## 2020-07-23 DIAGNOSIS — F32.9: ICD-10-CM

## 2020-07-23 DIAGNOSIS — Z90.710: ICD-10-CM
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
## 2020-07-23 DIAGNOSIS — S83.8X2A: Primary | ICD-10-CM

## 2020-07-23 DIAGNOSIS — G25.81: ICD-10-CM
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
## 2020-07-23 DIAGNOSIS — G62.9: ICD-10-CM

## 2020-07-23 DIAGNOSIS — Z96.652: ICD-10-CM

## 2020-07-23 LAB
ABSOLUTE BASOPHILS: 0 THOU/UL (ref 0–0.2)
ABSOLUTE EOSINOPHILS: 0 THOU/UL (ref 0–0.7)
ABSOLUTE MONOCYTES: 0.9 THOU/UL (ref 0–1.2)
ALBUMIN SERPL-MCNC: 3.2 G/DL (ref 3.4–5)
ALP SERPL-CCNC: 91 U/L (ref 46–116)
ALT SERPL-CCNC: 23 U/L (ref 30–65)
ANION GAP SERPL CALC-SCNC: 3 MMOL/L (ref 7–16)
AST SERPL-CCNC: 24 U/L (ref 15–37)
BASOPHILS NFR BLD AUTO: 0.3 %
BILIRUB SERPL-MCNC: 0.6 MG/DL
BILIRUB UR-MCNC: NEGATIVE MG/DL
BUN SERPL-MCNC: 12 MG/DL (ref 7–18)
CALCIUM SERPL-MCNC: 8.6 MG/DL (ref 8.5–10.1)
CHLORIDE SERPL-SCNC: 105 MMOL/L (ref 98–107)
CO2 SERPL-SCNC: 33 MMOL/L (ref 21–32)
COLOR UR: YELLOW
CREAT SERPL-MCNC: 0.9 MG/DL (ref 0.6–1.3)
EOSINOPHIL NFR BLD: 0.5 %
GLUCOSE SERPL-MCNC: 99 MG/DL (ref 70–99)
GRANULOCYTES NFR BLD MANUAL: 78.2 %
HCT VFR BLD CALC: 41.1 % (ref 37–47)
HGB BLD-MCNC: 14.1 GM/DL (ref 12–15)
KETONES UR STRIP-MCNC: NEGATIVE MG/DL
LYMPHOCYTES # BLD: 0.9 THOU/UL (ref 0.8–5.3)
LYMPHOCYTES NFR BLD AUTO: 10.8 %
MCH RBC QN AUTO: 34.9 PG (ref 26–34)
MCHC RBC AUTO-ENTMCNC: 34.4 G/DL (ref 28–37)
MCV RBC: 101.5 FL (ref 80–100)
MONOCYTES NFR BLD: 10.2 %
MPV: 8.1 FL. (ref 7.2–11.1)
NEUTROPHILS # BLD: 6.6 THOU/UL (ref 1.6–8.1)
NUCLEATED RBCS: 0 /100WBC
OPIATES UR-MCNC: POSITIVE NG/ML
PLATELET COUNT*: 124 THOU/UL (ref 150–400)
POTASSIUM SERPL-SCNC: 3.4 MMOL/L (ref 3.5–5.1)
PROT SERPL-MCNC: 5.9 G/DL (ref 6.4–8.2)
PROT UR QL STRIP: NEGATIVE
RBC # BLD AUTO: 4.05 MIL/UL (ref 4.2–5)
RBC # UR STRIP: NEGATIVE /UL
RDW-CV: 15.7 % (ref 10.5–14.5)
SODIUM SERPL-SCNC: 141 MMOL/L (ref 136–145)
SP GR UR STRIP: 1.02 (ref 1–1.03)
THC: POSITIVE
URINE CLARITY: CLEAR
URINE GLUCOSE-RANDOM: NEGATIVE
URINE LEUKOCYTES-REFLEX: NEGATIVE
URINE NITRITE-REFLEX: NEGATIVE
UROBILINOGEN UR STRIP-ACNC: 2 E.U./DL (ref 0.2–1)
WBC # BLD AUTO: 8.4 THOU/UL (ref 4–11)

## 2020-08-02 ENCOUNTER — HOSPITAL ENCOUNTER (EMERGENCY)
Dept: HOSPITAL 96 - M.ERS | Age: 55
Discharge: LEFT BEFORE DISCHARGE | End: 2020-08-02
Payer: COMMERCIAL

## 2020-08-02 DIAGNOSIS — Z53.21: Primary | ICD-10-CM

## 2020-08-03 ENCOUNTER — HOSPITAL ENCOUNTER (EMERGENCY)
Dept: HOSPITAL 96 - M.ERS | Age: 55
Discharge: HOME | End: 2020-08-03
Payer: COMMERCIAL

## 2020-08-03 VITALS — HEIGHT: 66 IN | WEIGHT: 180.01 LBS | BODY MASS INDEX: 28.93 KG/M2

## 2020-08-03 VITALS — DIASTOLIC BLOOD PRESSURE: 70 MMHG | SYSTOLIC BLOOD PRESSURE: 122 MMHG

## 2020-08-03 DIAGNOSIS — Z90.49: ICD-10-CM

## 2020-08-03 DIAGNOSIS — Z85.118: ICD-10-CM

## 2020-08-03 DIAGNOSIS — Z98.51: ICD-10-CM

## 2020-08-03 DIAGNOSIS — G62.9: ICD-10-CM

## 2020-08-03 DIAGNOSIS — G89.29: ICD-10-CM

## 2020-08-03 DIAGNOSIS — G25.81: ICD-10-CM

## 2020-08-03 DIAGNOSIS — Z96.652: ICD-10-CM

## 2020-08-03 DIAGNOSIS — Z90.710: ICD-10-CM

## 2020-08-03 DIAGNOSIS — R33.9: Primary | ICD-10-CM

## 2020-08-03 LAB
BILIRUB UR-MCNC: NEGATIVE MG/DL
COLOR UR: YELLOW
KETONES UR STRIP-MCNC: NEGATIVE MG/DL
PROT UR QL STRIP: NEGATIVE
RBC # UR STRIP: NEGATIVE /UL
SP GR UR STRIP: 1.01 (ref 1–1.03)
URINE CLARITY: CLEAR
URINE GLUCOSE-RANDOM: NEGATIVE
URINE LEUKOCYTES-REFLEX: NEGATIVE
URINE NITRITE-REFLEX: NEGATIVE
UROBILINOGEN UR STRIP-ACNC: 0.2 E.U./DL (ref 0.2–1)

## 2020-08-17 ENCOUNTER — HOSPITAL ENCOUNTER (EMERGENCY)
Dept: HOSPITAL 96 - M.ERS | Age: 55
Discharge: HOME | End: 2020-08-17
Payer: COMMERCIAL

## 2020-08-17 VITALS — SYSTOLIC BLOOD PRESSURE: 131 MMHG | DIASTOLIC BLOOD PRESSURE: 94 MMHG

## 2020-08-17 VITALS — WEIGHT: 180.01 LBS | BODY MASS INDEX: 28.93 KG/M2 | HEIGHT: 66 IN

## 2020-08-17 DIAGNOSIS — Z90.49: ICD-10-CM

## 2020-08-17 DIAGNOSIS — Z90.710: ICD-10-CM
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
## 2020-08-17 DIAGNOSIS — Z98.51: ICD-10-CM

## 2020-08-17 DIAGNOSIS — G25.81: ICD-10-CM

## 2020-08-17 DIAGNOSIS — G62.9: ICD-10-CM

## 2020-08-17 DIAGNOSIS — R55: ICD-10-CM

## 2020-08-17 DIAGNOSIS — G89.29: ICD-10-CM
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
## 2020-08-17 DIAGNOSIS — S80.02XA: Primary | ICD-10-CM
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
## 2020-08-17 DIAGNOSIS — F41.9: ICD-10-CM

## 2020-08-17 DIAGNOSIS — Z96.652: ICD-10-CM

## 2020-08-17 DIAGNOSIS — F32.9: ICD-10-CM
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
## 2020-08-17 DIAGNOSIS — Z85.118: ICD-10-CM

## 2020-08-17 LAB
ABSOLUTE BASOPHILS: 0 THOU/UL (ref 0–0.2)
ABSOLUTE EOSINOPHILS: 0.1 THOU/UL (ref 0–0.7)
ABSOLUTE MONOCYTES: 0.5 THOU/UL (ref 0–1.2)
ALBUMIN SERPL-MCNC: 2.9 G/DL (ref 3.4–5)
ALP SERPL-CCNC: 93 U/L (ref 46–116)
ALT SERPL-CCNC: 18 U/L (ref 30–65)
ANION GAP SERPL CALC-SCNC: 8 MMOL/L (ref 7–16)
APTT BLD: 25.1 SECONDS (ref 25–31.3)
AST SERPL-CCNC: 17 U/L (ref 15–37)
BASOPHILS NFR BLD AUTO: 0.7 %
BILIRUB SERPL-MCNC: 0.4 MG/DL
BUN SERPL-MCNC: 13 MG/DL (ref 7–18)
CALCIUM SERPL-MCNC: 8.9 MG/DL (ref 8.5–10.1)
CHLORIDE SERPL-SCNC: 101 MMOL/L (ref 98–107)
CO2 SERPL-SCNC: 26 MMOL/L (ref 21–32)
CREAT SERPL-MCNC: 0.9 MG/DL (ref 0.6–1.3)
EOSINOPHIL NFR BLD: 1 %
GLUCOSE SERPL-MCNC: 122 MG/DL (ref 70–99)
GRANULOCYTES NFR BLD MANUAL: 68.1 %
HCT VFR BLD CALC: 42.9 % (ref 37–47)
HGB BLD-MCNC: 14.8 GM/DL (ref 12–15)
INR PPP: 1
LYMPHOCYTES # BLD: 1.3 THOU/UL (ref 0.8–5.3)
LYMPHOCYTES NFR BLD AUTO: 21.9 %
MCH RBC QN AUTO: 35 PG (ref 26–34)
MCHC RBC AUTO-ENTMCNC: 34.5 G/DL (ref 28–37)
MCV RBC: 101.3 FL (ref 80–100)
MONOCYTES NFR BLD: 8.3 %
MPV: 7.9 FL. (ref 7.2–11.1)
NEUTROPHILS # BLD: 4 THOU/UL (ref 1.6–8.1)
NUCLEATED RBCS: 0 /100WBC
PLATELET COUNT*: 160 THOU/UL (ref 150–400)
POTASSIUM SERPL-SCNC: 3.6 MMOL/L (ref 3.5–5.1)
PROT SERPL-MCNC: 6.2 G/DL (ref 6.4–8.2)
PROTHROMBIN TIME: 10.6 SECONDS (ref 9.2–11.5)
RBC # BLD AUTO: 4.23 MIL/UL (ref 4.2–5)
RDW-CV: 15.3 % (ref 10.5–14.5)
SODIUM SERPL-SCNC: 135 MMOL/L (ref 136–145)
WBC # BLD AUTO: 5.9 THOU/UL (ref 4–11)

## 2020-08-18 NOTE — EKG
[place] [address]
Phone:  [phone]                     ELECTROCARDIOGRAM REPORT      
_______________________________________________________________________________
 
Name:         SANTOS MCFARLANE                   Room:                     [facility]#:    [id number]     Account #:     [id number]  
Admission:    20    Attend Phys:                     
Discharge:    20    Date of Birth: [birth year]  
Date of Service: 20 0016  Report #:      [id number]
        [id number]
_______________________________________________________________________________
THIS REPORT FOR:  //name//                      
 
                         [facility] ED
                                       
Test Date:    2020               Test Time:    00:16:07
Pat Name:     SANTOS MCFARLANE                Department:   
Patient ID:   [id number]            Room:          
Gender:                               Technician:   
:          1965               Requested By: Carrie Mclean
Order Number: [id number] MD:   Hans Watts
                                 Measurements
Intervals                              Axis          
Rate:         120                      P:            
NC:                                    QRS:          -11
QRSD:         100                      T:            88
QT:           315                                    
QTc:          445                                    
                           Interpretive Statements
Sinus tachycardia
RSR' in V1 or V2, right VCD or RVH
Compared to ECG 2020 19:39:58
sinus tachycardia persists
Electronically Signed On 2020 14:39:15 CDT by Hans Watts
[email or web address]
 
 
 
 
 
 
 
 
 
 
 
 
 
 
 
 
 
 
 
 
  <ELECTRONICALLY SIGNED>
                                           By: Hans Watts MD, [facility]     
  20     1439
D: 20 0016   _____________________________________
T: 20 0016   Hans Watts MD, [facility]       /EPI

## 2020-08-27 ENCOUNTER — HOSPITAL ENCOUNTER (EMERGENCY)
Dept: HOSPITAL 96 - M.ERS | Age: 55
Discharge: HOME | End: 2020-08-27
Payer: COMMERCIAL

## 2020-08-27 VITALS — SYSTOLIC BLOOD PRESSURE: 141 MMHG | DIASTOLIC BLOOD PRESSURE: 105 MMHG

## 2020-08-27 VITALS — WEIGHT: 180.01 LBS | HEIGHT: 66 IN | BODY MASS INDEX: 28.93 KG/M2

## 2020-08-27 DIAGNOSIS — Z85.118: ICD-10-CM

## 2020-08-27 DIAGNOSIS — Z79.899: ICD-10-CM

## 2020-08-27 DIAGNOSIS — F12.90: ICD-10-CM

## 2020-08-27 DIAGNOSIS — Z96.652: ICD-10-CM

## 2020-08-27 DIAGNOSIS — Z90.710: ICD-10-CM

## 2020-08-27 DIAGNOSIS — G89.29: ICD-10-CM

## 2020-08-27 DIAGNOSIS — Z98.51: ICD-10-CM

## 2020-08-27 DIAGNOSIS — R11.10: ICD-10-CM

## 2020-08-27 DIAGNOSIS — Z90.49: ICD-10-CM

## 2020-08-27 DIAGNOSIS — K56.41: Primary | ICD-10-CM

## 2020-08-27 DIAGNOSIS — G25.81: ICD-10-CM

## 2020-08-27 LAB
ABSOLUTE BASOPHILS: 0.1 THOU/UL (ref 0–0.2)
ABSOLUTE EOSINOPHILS: 0.1 THOU/UL (ref 0–0.7)
ABSOLUTE MONOCYTES: 0.4 THOU/UL (ref 0–1.2)
ALBUMIN SERPL-MCNC: 3.5 G/DL (ref 3.4–5)
ALP SERPL-CCNC: 142 U/L (ref 46–116)
ALT SERPL-CCNC: 28 U/L (ref 30–65)
ANION GAP SERPL CALC-SCNC: 6 MMOL/L (ref 7–16)
AST SERPL-CCNC: 18 U/L (ref 15–37)
BASOPHILS NFR BLD AUTO: 0.8 %
BILIRUB SERPL-MCNC: 0.6 MG/DL
BUN SERPL-MCNC: 10 MG/DL (ref 7–18)
CALCIUM SERPL-MCNC: 9.2 MG/DL (ref 8.5–10.1)
CHLORIDE SERPL-SCNC: 103 MMOL/L (ref 98–107)
CO2 SERPL-SCNC: 31 MMOL/L (ref 21–32)
CREAT SERPL-MCNC: 0.9 MG/DL (ref 0.6–1.3)
EOSINOPHIL NFR BLD: 1 %
GLUCOSE SERPL-MCNC: 115 MG/DL (ref 70–99)
GRANULOCYTES NFR BLD MANUAL: 81.8 %
HCT VFR BLD CALC: 46.7 % (ref 37–47)
HGB BLD-MCNC: 15.9 GM/DL (ref 12–15)
LIPASE: 62 U/L (ref 73–393)
LYMPHOCYTES # BLD: 0.9 THOU/UL (ref 0.8–5.3)
LYMPHOCYTES NFR BLD AUTO: 11.8 %
MCH RBC QN AUTO: 34.5 PG (ref 26–34)
MCHC RBC AUTO-ENTMCNC: 34.1 G/DL (ref 28–37)
MCV RBC: 101.3 FL (ref 80–100)
MONOCYTES NFR BLD: 4.6 %
MPV: 8.2 FL. (ref 7.2–11.1)
NEUTROPHILS # BLD: 6.6 THOU/UL (ref 1.6–8.1)
NUCLEATED RBCS: 0 /100WBC
PLATELET COUNT*: 211 THOU/UL (ref 150–400)
POTASSIUM SERPL-SCNC: 3.8 MMOL/L (ref 3.5–5.1)
PROT SERPL-MCNC: 6.9 G/DL (ref 6.4–8.2)
RBC # BLD AUTO: 4.61 MIL/UL (ref 4.2–5)
RDW-CV: 15.1 % (ref 10.5–14.5)
SODIUM SERPL-SCNC: 140 MMOL/L (ref 136–145)
WBC # BLD AUTO: 8 THOU/UL (ref 4–11)

## 2020-08-28 ENCOUNTER — HOSPITAL ENCOUNTER (EMERGENCY)
Dept: HOSPITAL 96 - M.ERS | Age: 55
Discharge: HOME | End: 2020-08-28
Payer: COMMERCIAL

## 2020-08-28 VITALS — BODY MASS INDEX: 28.93 KG/M2 | HEIGHT: 66 IN | WEIGHT: 180.01 LBS

## 2020-08-28 VITALS — SYSTOLIC BLOOD PRESSURE: 137 MMHG | DIASTOLIC BLOOD PRESSURE: 100 MMHG

## 2020-08-28 DIAGNOSIS — Z90.710: ICD-10-CM

## 2020-08-28 DIAGNOSIS — Z96.652: ICD-10-CM

## 2020-08-28 DIAGNOSIS — G25.81: ICD-10-CM

## 2020-08-28 DIAGNOSIS — Z98.51: ICD-10-CM

## 2020-08-28 DIAGNOSIS — Z85.118: ICD-10-CM

## 2020-08-28 DIAGNOSIS — G62.9: ICD-10-CM

## 2020-08-28 DIAGNOSIS — K31.84: Primary | ICD-10-CM

## 2020-08-28 DIAGNOSIS — Z90.49: ICD-10-CM

## 2020-08-28 DIAGNOSIS — G89.29: ICD-10-CM

## 2020-10-01 ENCOUNTER — HOSPITAL ENCOUNTER (OUTPATIENT)
Dept: HOSPITAL 96 - M.ERS | Age: 55
Setting detail: OBSERVATION
LOS: 3 days | Discharge: HOME | End: 2020-10-04
Attending: INTERNAL MEDICINE | Admitting: INTERNAL MEDICINE
Payer: COMMERCIAL

## 2020-10-01 VITALS — DIASTOLIC BLOOD PRESSURE: 78 MMHG | SYSTOLIC BLOOD PRESSURE: 105 MMHG

## 2020-10-01 VITALS — BODY MASS INDEX: 29.25 KG/M2 | WEIGHT: 182 LBS | HEIGHT: 65.98 IN

## 2020-10-01 DIAGNOSIS — K59.00: ICD-10-CM

## 2020-10-01 DIAGNOSIS — B96.89: ICD-10-CM

## 2020-10-01 DIAGNOSIS — I95.1: ICD-10-CM

## 2020-10-01 DIAGNOSIS — J44.9: ICD-10-CM

## 2020-10-01 DIAGNOSIS — M54.9: ICD-10-CM

## 2020-10-01 DIAGNOSIS — Z79.899: ICD-10-CM

## 2020-10-01 DIAGNOSIS — R33.9: ICD-10-CM

## 2020-10-01 DIAGNOSIS — E83.42: ICD-10-CM

## 2020-10-01 DIAGNOSIS — N39.0: ICD-10-CM

## 2020-10-01 DIAGNOSIS — E87.6: ICD-10-CM

## 2020-10-01 DIAGNOSIS — Z85.118: ICD-10-CM

## 2020-10-01 DIAGNOSIS — Z87.891: ICD-10-CM

## 2020-10-01 DIAGNOSIS — R10.9: Primary | ICD-10-CM

## 2020-10-01 DIAGNOSIS — G25.81: ICD-10-CM

## 2020-10-01 DIAGNOSIS — F41.1: ICD-10-CM

## 2020-10-01 DIAGNOSIS — G89.29: ICD-10-CM

## 2020-10-01 DIAGNOSIS — G47.00: ICD-10-CM

## 2020-10-01 DIAGNOSIS — F32.9: ICD-10-CM

## 2020-10-01 DIAGNOSIS — G62.9: ICD-10-CM

## 2020-10-01 LAB
ABSOLUTE BASOPHILS: 0 THOU/UL (ref 0–0.2)
ABSOLUTE EOSINOPHILS: 0 THOU/UL (ref 0–0.7)
ABSOLUTE MONOCYTES: 0.3 THOU/UL (ref 0–1.2)
ALBUMIN SERPL-MCNC: 3.1 G/DL (ref 3.4–5)
ALP SERPL-CCNC: 87 U/L (ref 46–116)
ALT SERPL-CCNC: 21 U/L (ref 30–65)
ANION GAP SERPL CALC-SCNC: 6 MMOL/L (ref 7–16)
AST SERPL-CCNC: 19 U/L (ref 15–37)
BACTERIA-REFLEX: (no result) /HPF
BASOPHILS NFR BLD AUTO: 0.7 %
BILIRUB SERPL-MCNC: 0.6 MG/DL
BILIRUB UR-MCNC: NEGATIVE MG/DL
BUN SERPL-MCNC: 13 MG/DL (ref 7–18)
CALCIUM SERPL-MCNC: 8.7 MG/DL (ref 8.5–10.1)
CHLORIDE SERPL-SCNC: 101 MMOL/L (ref 98–107)
CO2 SERPL-SCNC: 30 MMOL/L (ref 21–32)
COLOR UR: (no result)
CREAT SERPL-MCNC: 1 MG/DL (ref 0.6–1.3)
EOSINOPHIL NFR BLD: 1.3 %
GLUCOSE SERPL-MCNC: 133 MG/DL (ref 70–99)
GRANULOCYTES NFR BLD MANUAL: 63.2 %
HCT VFR BLD CALC: 43.6 % (ref 37–47)
HGB BLD-MCNC: 15.1 GM/DL (ref 12–15)
HYALINE CASTS #/AREA URNS LPF: (no result) /LPF
KETONES UR STRIP-MCNC: NEGATIVE MG/DL
LYMPHOCYTES # BLD: 0.9 THOU/UL (ref 0.8–5.3)
LYMPHOCYTES NFR BLD AUTO: 26 %
MAGNESIUM SERPL-MCNC: 1.7 MG/DL (ref 1.8–2.4)
MCH RBC QN AUTO: 34.5 PG (ref 26–34)
MCHC RBC AUTO-ENTMCNC: 34.7 G/DL (ref 28–37)
MCV RBC: 99.5 FL (ref 80–100)
MONOCYTES NFR BLD: 8.8 %
MPV: 7.9 FL. (ref 7.2–11.1)
MUCUS: (no result) STRN/LPF
NEUTROPHILS # BLD: 2.3 THOU/UL (ref 1.6–8.1)
NUCLEATED RBCS: 0 /100WBC
PLATELET COUNT*: 131 THOU/UL (ref 150–400)
POTASSIUM SERPL-SCNC: 2.6 MMOL/L (ref 3.5–5.1)
PROT SERPL-MCNC: 6 G/DL (ref 6.4–8.2)
PROT UR QL STRIP: (no result)
RBC # BLD AUTO: 4.38 MIL/UL (ref 4.2–5)
RBC # UR STRIP: (no result) /UL
RBC #/AREA URNS HPF: (no result) /HPF (ref 0–2)
RDW-CV: 14.5 % (ref 10.5–14.5)
SODIUM SERPL-SCNC: 137 MMOL/L (ref 136–145)
SP GR UR STRIP: >= 1.03 (ref 1–1.03)
SQUAMOUS: (no result) /LPF (ref 0–3)
URINE CLARITY: CLEAR
URINE GLUCOSE-RANDOM: NEGATIVE
URINE LEUKOCYTES-REFLEX: NEGATIVE
URINE NITRITE-REFLEX: POSITIVE
URINE WBC-REFLEX: (no result) /HPF (ref 0–5)
UROBILINOGEN UR STRIP-ACNC: 1 E.U./DL (ref 0.2–1)
WBC # BLD AUTO: 3.6 THOU/UL (ref 4–11)

## 2020-10-02 VITALS — DIASTOLIC BLOOD PRESSURE: 88 MMHG | SYSTOLIC BLOOD PRESSURE: 133 MMHG

## 2020-10-02 VITALS — DIASTOLIC BLOOD PRESSURE: 77 MMHG | SYSTOLIC BLOOD PRESSURE: 126 MMHG

## 2020-10-02 VITALS — SYSTOLIC BLOOD PRESSURE: 122 MMHG | DIASTOLIC BLOOD PRESSURE: 80 MMHG

## 2020-10-02 VITALS — DIASTOLIC BLOOD PRESSURE: 62 MMHG | SYSTOLIC BLOOD PRESSURE: 103 MMHG

## 2020-10-02 VITALS — SYSTOLIC BLOOD PRESSURE: 110 MMHG | DIASTOLIC BLOOD PRESSURE: 70 MMHG

## 2020-10-02 VITALS — SYSTOLIC BLOOD PRESSURE: 114 MMHG | DIASTOLIC BLOOD PRESSURE: 70 MMHG

## 2020-10-02 LAB
ABSOLUTE BASOPHILS: 0 THOU/UL (ref 0–0.2)
ABSOLUTE EOSINOPHILS: 0 THOU/UL (ref 0–0.7)
ABSOLUTE MONOCYTES: 0.4 THOU/UL (ref 0–1.2)
ANION GAP SERPL CALC-SCNC: 8 MMOL/L (ref 7–16)
BASOPHILS NFR BLD AUTO: 0.5 %
BUN SERPL-MCNC: 10 MG/DL (ref 7–18)
CALCIUM SERPL-MCNC: 8.2 MG/DL (ref 8.5–10.1)
CHLORIDE SERPL-SCNC: 103 MMOL/L (ref 98–107)
CO2 SERPL-SCNC: 29 MMOL/L (ref 21–32)
CREAT SERPL-MCNC: 0.9 MG/DL (ref 0.6–1.3)
EOSINOPHIL NFR BLD: 0.9 %
GLUCOSE SERPL-MCNC: 113 MG/DL (ref 70–99)
GRANULOCYTES NFR BLD MANUAL: 68.8 %
HCT VFR BLD CALC: 40.9 % (ref 37–47)
HGB BLD-MCNC: 14.1 GM/DL (ref 12–15)
LYMPHOCYTES # BLD: 1 THOU/UL (ref 0.8–5.3)
LYMPHOCYTES NFR BLD AUTO: 21.5 %
MAGNESIUM SERPL-MCNC: 2.2 MG/DL (ref 1.8–2.4)
MCH RBC QN AUTO: 34.2 PG (ref 26–34)
MCHC RBC AUTO-ENTMCNC: 34.3 G/DL (ref 28–37)
MCV RBC: 99.6 FL (ref 80–100)
MONOCYTES NFR BLD: 8.3 %
MPV: 7.8 FL. (ref 7.2–11.1)
NEUTROPHILS # BLD: 3 THOU/UL (ref 1.6–8.1)
NUCLEATED RBCS: 0 /100WBC
PLATELET COUNT*: 143 THOU/UL (ref 150–400)
POTASSIUM SERPL-SCNC: 2.9 MMOL/L (ref 3.5–5.1)
RBC # BLD AUTO: 4.11 MIL/UL (ref 4.2–5)
RDW-CV: 14.6 % (ref 10.5–14.5)
SODIUM SERPL-SCNC: 140 MMOL/L (ref 136–145)
WBC # BLD AUTO: 4.4 THOU/UL (ref 4–11)

## 2020-10-02 NOTE — EKG
[place] [address]
Phone:  [phone]                     ELECTROCARDIOGRAM REPORT      
_______________________________________________________________________________
 
Name:         MAEVESANTOS                   Room:          [address]#:    [id number]     Account #:     [id number]  
Admission:    10/02/20    Attend Phys:   Alyssa Peacock, 
Discharge:                Date of Birth: [birth year]  
Date of Service: 10/01/20 2103  Report #:      [id number]
        [id number]
_______________________________________________________________________________
THIS REPORT FOR:  //name//                      
 
                         [facility] ED
                                       
Test Date:    2020-10-01               Test Time:    21:03:01
Pat Name:     SANTOS MCFARLANE                Department:   
Patient ID:   [id number]            Room:         [facility]
Gender:       F                        Technician:   
:          1965               Requested By: Daisy Sexton
Order Number: [id number] MD:   Hans Watts
                                 Measurements
Intervals                              Axis          
Rate:         109                      P:            60
DC:           163                      QRS:          -34
QRSD:         109                      T:            80
QT:           350                                    
QTc:          472                                    
                           Interpretive Statements
Sinus tachycardia
Left axis deviation
RSR' in V1 or V2, probably normal variant
Compared to ECG 2020 00:16:07
Left-axis deviation now present
Right ventricular hypertrophy no longer present
Electronically Signed On 10-2-2020 13:53:16 CDT by Hans Watts
[email or web address]
 
 
 
 
 
 
 
 
 
 
 
 
 
 
 
 
 
 
  <ELECTRONICALLY SIGNED>
                                           By: Hans Watts MD, FACC     
  10/02/20     1353
D: 10/01/20 2103   _____________________________________
T: 10/01/20 2103   Hans Watts MD, FACC       /EPI

## 2020-10-03 VITALS — DIASTOLIC BLOOD PRESSURE: 74 MMHG | SYSTOLIC BLOOD PRESSURE: 118 MMHG

## 2020-10-03 VITALS — SYSTOLIC BLOOD PRESSURE: 114 MMHG | DIASTOLIC BLOOD PRESSURE: 78 MMHG

## 2020-10-03 VITALS — DIASTOLIC BLOOD PRESSURE: 76 MMHG | SYSTOLIC BLOOD PRESSURE: 115 MMHG

## 2020-10-03 VITALS — DIASTOLIC BLOOD PRESSURE: 77 MMHG | SYSTOLIC BLOOD PRESSURE: 132 MMHG

## 2020-10-03 VITALS — SYSTOLIC BLOOD PRESSURE: 120 MMHG | DIASTOLIC BLOOD PRESSURE: 81 MMHG

## 2020-10-03 LAB
ALBUMIN SERPL-MCNC: 2.4 G/DL (ref 3.4–5)
ALP SERPL-CCNC: 63 U/L (ref 46–116)
ALT SERPL-CCNC: 16 U/L (ref 30–65)
ANION GAP SERPL CALC-SCNC: 7 MMOL/L (ref 7–16)
AST SERPL-CCNC: 15 U/L (ref 15–37)
BILIRUB SERPL-MCNC: 0.3 MG/DL
BUN SERPL-MCNC: 9 MG/DL (ref 7–18)
CALCIUM SERPL-MCNC: 8 MG/DL (ref 8.5–10.1)
CHLORIDE SERPL-SCNC: 108 MMOL/L (ref 98–107)
CO2 SERPL-SCNC: 26 MMOL/L (ref 21–32)
CREAT SERPL-MCNC: 0.8 MG/DL (ref 0.6–1.3)
GLUCOSE SERPL-MCNC: 82 MG/DL (ref 70–99)
HCT VFR BLD CALC: 37.1 % (ref 37–47)
HGB BLD-MCNC: 12.7 GM/DL (ref 12–15)
MAGNESIUM SERPL-MCNC: 1.8 MG/DL (ref 1.8–2.4)
MCH RBC QN AUTO: 34.3 PG (ref 26–34)
MCHC RBC AUTO-ENTMCNC: 34.2 G/DL (ref 28–37)
MCV RBC: 100.2 FL (ref 80–100)
MPV: 8.2 FL. (ref 7.2–11.1)
PLATELET COUNT*: 120 THOU/UL (ref 150–400)
POTASSIUM SERPL-SCNC: 2.8 MMOL/L (ref 3.5–5.1)
PROT SERPL-MCNC: 4.8 G/DL (ref 6.4–8.2)
RBC # BLD AUTO: 3.7 MIL/UL (ref 4.2–5)
RDW-CV: 14.4 % (ref 10.5–14.5)
SODIUM SERPL-SCNC: 141 MMOL/L (ref 136–145)
WBC # BLD AUTO: 3.8 THOU/UL (ref 4–11)

## 2020-10-04 VITALS — DIASTOLIC BLOOD PRESSURE: 87 MMHG | SYSTOLIC BLOOD PRESSURE: 135 MMHG

## 2020-10-04 VITALS — SYSTOLIC BLOOD PRESSURE: 95 MMHG | DIASTOLIC BLOOD PRESSURE: 63 MMHG

## 2020-10-04 VITALS — SYSTOLIC BLOOD PRESSURE: 135 MMHG | DIASTOLIC BLOOD PRESSURE: 87 MMHG

## 2020-10-04 VITALS — DIASTOLIC BLOOD PRESSURE: 77 MMHG | SYSTOLIC BLOOD PRESSURE: 117 MMHG

## 2020-10-04 VITALS — SYSTOLIC BLOOD PRESSURE: 125 MMHG | DIASTOLIC BLOOD PRESSURE: 81 MMHG

## 2020-10-04 LAB
ALBUMIN SERPL-MCNC: 2.4 G/DL (ref 3.4–5)
ALP SERPL-CCNC: 64 U/L (ref 46–116)
ALT SERPL-CCNC: 17 U/L (ref 30–65)
ANION GAP SERPL CALC-SCNC: 8 MMOL/L (ref 7–16)
AST SERPL-CCNC: 15 U/L (ref 15–37)
BILIRUB SERPL-MCNC: 0.3 MG/DL
BUN SERPL-MCNC: 7 MG/DL (ref 7–18)
CALCIUM SERPL-MCNC: 8.2 MG/DL (ref 8.5–10.1)
CHLORIDE SERPL-SCNC: 109 MMOL/L (ref 98–107)
CO2 SERPL-SCNC: 23 MMOL/L (ref 21–32)
CREAT SERPL-MCNC: 0.7 MG/DL (ref 0.6–1.3)
GLUCOSE SERPL-MCNC: 84 MG/DL (ref 70–99)
HCT VFR BLD CALC: 37.4 % (ref 37–47)
HGB BLD-MCNC: 12.7 GM/DL (ref 12–15)
MCH RBC QN AUTO: 34.1 PG (ref 26–34)
MCHC RBC AUTO-ENTMCNC: 33.9 G/DL (ref 28–37)
MCV RBC: 100.6 FL (ref 80–100)
MPV: 7.9 FL. (ref 7.2–11.1)
PLATELET COUNT*: 124 THOU/UL (ref 150–400)
POTASSIUM SERPL-SCNC: 3.3 MMOL/L (ref 3.5–5.1)
PROT SERPL-MCNC: 4.8 G/DL (ref 6.4–8.2)
RBC # BLD AUTO: 3.72 MIL/UL (ref 4.2–5)
RDW-CV: 14.3 % (ref 10.5–14.5)
SODIUM SERPL-SCNC: 140 MMOL/L (ref 136–145)
WBC # BLD AUTO: 3.3 THOU/UL (ref 4–11)

## 2020-10-06 NOTE — PATH
[address]  [address]                    PATHOLOGY RPT PROCEDURE       
_______________________________________________________________________________
 
Name:       SHIRA MCFARLANE MITZI                    Room:           [address] [place]#:  [id number]      Account #:      [id number]  
Admission:  10/02/20     Date of Birth:  [birth year]  
Discharge:  10/04/20                   Report #:    [id number]
                                                         Path Case #: [id number]
_______________________________________________________________________________
 
[facility] Accession Number: [id number]
.                                                                01
Material submitted:                                        .
PART A: small bowel - SMALL BOWEL BIOPSY
PART B: stomach - ANTRAL BIOPSY
PART C: colon - MID TRANSVERSE COLON POLYP. Modifiers: mid, transverse
PART D: sigmoid colon - SIGMOID COLON POLYP
.                                                                01
Clinical history:                                          .
A. FOR IRON DEFICIENCY
B. FOR H PYLORI
HYPOALKEMIA/MAGNESEMIA, UTI, CONSTIPATION, ANOREXIA
.                                                                02
**********************************************************************
Diagnosis:
A. Small bowel biopsy:
- Moderate non-specific active duodenitis, negative for granulomas,
significant intraepithelial lymphocytosis/villous atrophy, viral
inclusions and dysplasia.
.
B. Antral biopsy:
- Mild non-specific chronic antral gastritis, negative for Helicobacter
pylori organisms, granulomas and dysplasia.
.
C. Mid transverse colon polyp:
- Tubular adenoma, negative for high-grade dysplasia.
.
D. Sigmoid colon polyp:
- Hyperplastic polyp.
(ROSA:pit 10/06/2020)
[facility]  10/06/2020  1417 Local
**********************************************************************
.                                                                02
Comment:
Special stain on B:  H. pylori immuno
(ROSA:pit 10/06/2020)
.                                                                02
Electronically signed:                                     .
Mark Mayfield MD, Pathologist
NPI- [id number]
.                                                                01
Gross description:                                         .
A.  The specimen is received in formalin, labeled "Shira Mcfarlane, small bowel
biopsy".  Received are two segments of pale tan soft tissue ranging in
size from 0.4 to 0.6 cm in maximum dimensions.  The specimen is submitted
entirely in cassette A1.
.
B.  The specimen is received in formalin, labeled "Shira Mcfarlane, antral
 
[place] [address]                    PATHOLOGY RPT PROCEDURE       
_______________________________________________________________________________
 
Name:       SHIRA MCFARLANE                    Room:           [address]#:  [id number]      Account #:      [id number]  
Admission:  10/02/20     Date of Birth:  [birth year]  
Discharge:  10/04/20                   Report #:    [id number]
                                                         Path Case #: [id number]
_______________________________________________________________________________
biopsy".  Received are two segments of pale tan soft tissue ranging in
size from 0.5 to 0.7 cm in maximum dimensions.  The specimen is submitted
entirely in cassette B1.
.
C.  The specimen is received in formalin, labeled "Shira Perico, mid
transverse colon polyp".  Received is a segment of pale tan soft tissue
measuring 0.5 cm in maximum dimensions.  The specimen is submitted
entirely in cassette C1.
.
D.  The specimen is received in formalin, labeled "Shira Perico, sigmoid
colon polyp".  Received is a segment of pale tan soft tissue measuring 0.4
cm in maximum dimensions.  The specimen is submitted entirely in cassette
D1.
([facility]; 10/5/2020)
QAC/QAC  10/05/2020  1738 Local
.                                                                02
Pathologist provided ICD-10:
K29.80, K29.50, D12.3, K63.5
.                                                                02
CPT                                                        .
725615, 048113, 690494, 566762, G71785
Specimen Comment: A courtesy copy of this report has been sent to [phone]
[phone]
Specimen Comment: [phone], [phone]
Specimen Comment: Report sent to ,DR MAHARAJ / DR GERMAN
***Performed at:  01
   [facility]
   [address] [place] [address] [address], [place], [place]  [id number]
   MD Bello Delacruz MD Phone:  [phone]
***Performed at:  02
   [facility] [address], [place]  [id number]
   MD Mark Mayfield MD Phone:  [phone]

## 2020-10-09 NOTE — CON
[address]  [address]                    CONSULTATION                  
_______________________________________________________________________________
 
Name:       SANTOS MCFARLANE                    Room:           [address] [place]#:  [id number]      Account #:      [id number]  
Admission:  10/02/20     Attend Phys:    Alyssa Peacock MD 
Discharge:  10/04/20     Date of Birth:  [birth year]  
         Report #: [id number]
                                                                     [id number]  
_______________________________________________________________________________
THIS REPORT FOR:  //name//                      
 
cc:  Liane Durand Anna S. DO                                                    
THIS REPORT FOR:  //name//                      
 
CC: Liane Rubin
 
DICTATED BY: Caren Luna [facility]
 
DATE OF SERVICE:  10/02/2020
 
 
PRIMARY CARE PHYSICIAN:  Dr. Liane Durand.
 
Please note at the time of this dictation, the patient was seen and physically
examined by myself.
 
REASON FOR CONSULTATION:  Abdominal pain, constipation.
 
HISTORY OF PRESENT ILLNESS:  This is a 55-year-old female who presented to the
Emergency Room after having difficulty with urinating for the last 3-4 days, but
she has also been having issues of constipation, which has gotten worse over the
last couple of months.  She states typically she goes about every 3-4 days, but
this time, it seems like it has been a little bit longer and they have been very
hard pellets.  She did get a suppository last night and she had a small, she
states, evacuation at that time.  She does not typically take anything for her
bowels at this time.  She did have a colonoscopy back in 2018 that showed 4 out
of the 6 polyps were tubular adenoma with repeat in 3 years.  She also had an
upper scope that showed hiatal hernia 2 cm that was noted and also on the
colonoscopy some external hemorrhoids at that time.  She also was noted to have
idiopathic gastroparesis.  The patient has noticed that over the last month, she
has had a 30-pound weight loss and a decreased appetite and has not been wanting
to eat very much, which could be contributing to her constipation that is
keeping her very full and decreasing her appetite.  Otherwise, no other issues
noted at this time.
 
ALLERGIES:  No known drug allergies.
 
MEDICATIONS:  From home include clonazepam, metoclopramide, albuterol, ProAir
inhaler, gabapentin, Seroquel, midodrine, fludrocortisone acetate, Zofran and
some pantoprazole.
 
PAST MEDICAL HISTORY:  Idiopathic gastroparesis, constipation, history of lung
 
 
 
[place] [address]                    CONSULTATION                  
_______________________________________________________________________________
 
Name:       SANTOS MCFARLANE                    Room:           [address]#:  [id number]      Account #:      [id number]  
Admission:  10/02/20     Attend Phys:    Alyssa Peacock MD 
Discharge:  10/04/20     Date of Birth:  [birth year]  
         Report #: [id number]
                                                                     [id number]  
_______________________________________________________________________________
cancer and some mental health issues.
 
PAST SURGICAL HISTORY:  Cholecystectomy, hysterectomy, tubal ligation and a
right lobectomy for her lung cancer.
 
FAMILY HISTORY:  Negative for any GI or female cancers.
 
SOCIAL HISTORY:  Quit smoking about a year ago.  She smokes marijuana about once
a month and denies any alcohol use.
 
REVIEW OF SYSTEMS:  Twelve-point review of systems is essentially negative
except what is mentioned in the HPI.
 
PHYSICAL EXAMINATION:
VITAL SIGNS:  Temperature 36.3, pulse 104, respirations 18, blood pressure
142/88.
HEART:  Regular rate and rhythm.
LUNGS:  Diminished, absent on the right upper lobe.
ABDOMEN:  Soft, positive bowel sounds in all 4 quadrants with some left lower
quadrant tenderness noted to palpation.
 
LABORATORY DATA:  Hemoglobin 15.1, white count 3.6, platelets 131.  Potassium
was low at 2.6 and getting replaced.  GFR is 58 and she had positive nitrites. 
CT scan shows circumferential wall thickening in the lower rectum with adjacent
edema.
 
IMPRESSION:
1.  Abdominal pain, left lower quadrant.
2.  Chronic constipation.
3.  Decreased appetite.
4.  Weight loss about 30 pounds.
5.  Idiopathic gastroparesis.
6.  History of colon polyps, tubular adenoma, colonoscopy due next year in 2021.
7.  Urinary tract infection.
 
PLAN:
1.  Dulcolax 5 mg 4 tablets now.
2.  Continue with her clear liquids.
3.  Await above results.  She is also on MiraLax b.i.d.
4.  The patient may benefit from a colonoscopy while she is here with her
abnormality in the lower rectal area.  Further recommendations to be made once
Dr. Rivera sees the patient later today.
 
 
 
 
[place] [address]                    CONSULTATION                  
_______________________________________________________________________________
 
Name:       SANTOS MCFARLANE MITZI                    Room:           [address] [place]#:  [id number]      Account #:      [id number]  
Admission:  10/02/20     Attend Phys:    Alyssa Peacock MD 
Discharge:  10/04/20     Date of Birth:  [birth year]  
         Report #: [id number]
                                                                     [id number]  
_______________________________________________________________________________
Thank you for allowing us to participate in this patient's care.  Please do not
hesitate to call with any questions in regard to this consult.
 
 
 
 
 
 
 
 
 
 
 
 
 
 
 
 
 
 
 
 
 
 
 
 
 
 
 
 
 
 
 
 
 
 
 
 
 
 
 
 
 
 
<ELECTRONICALLY SIGNED>
                                        By:  Murray Rivera DO          
10/09/20     0738
D: 10/02/20 1106_______________________________________
T: 10/02/20 1159Murray Rivera DO             /nt

## 2020-10-22 ENCOUNTER — HOSPITAL ENCOUNTER (EMERGENCY)
Dept: HOSPITAL 96 - M.ERS | Age: 55
Discharge: HOME | End: 2020-10-22
Payer: COMMERCIAL

## 2020-10-22 VITALS — SYSTOLIC BLOOD PRESSURE: 148 MMHG | DIASTOLIC BLOOD PRESSURE: 96 MMHG

## 2020-10-22 VITALS — BODY MASS INDEX: 28.12 KG/M2 | WEIGHT: 175 LBS | HEIGHT: 66 IN

## 2020-10-22 DIAGNOSIS — R10.9: ICD-10-CM

## 2020-10-22 DIAGNOSIS — R11.2: Primary | ICD-10-CM

## 2020-10-22 DIAGNOSIS — Z79.899: ICD-10-CM

## 2020-10-22 DIAGNOSIS — Z90.49: ICD-10-CM

## 2020-10-22 DIAGNOSIS — J44.9: ICD-10-CM

## 2020-10-22 DIAGNOSIS — K59.00: ICD-10-CM

## 2020-10-22 DIAGNOSIS — Z90.710: ICD-10-CM

## 2020-10-22 LAB
ABSOLUTE BASOPHILS: 0 THOU/UL (ref 0–0.2)
ABSOLUTE EOSINOPHILS: 0 THOU/UL (ref 0–0.7)
ABSOLUTE MONOCYTES: 0.4 THOU/UL (ref 0–1.2)
ALBUMIN SERPL-MCNC: 2.9 G/DL (ref 3.4–5)
ALP SERPL-CCNC: 74 U/L (ref 46–116)
ALT SERPL-CCNC: 30 U/L (ref 30–65)
ANION GAP SERPL CALC-SCNC: 6 MMOL/L (ref 7–16)
AST SERPL-CCNC: 26 U/L (ref 15–37)
BASOPHILS NFR BLD AUTO: 0.7 %
BILIRUB SERPL-MCNC: 0.5 MG/DL
BILIRUB UR-MCNC: NEGATIVE MG/DL
BUN SERPL-MCNC: 12 MG/DL (ref 7–18)
CALCIUM SERPL-MCNC: 8.9 MG/DL (ref 8.5–10.1)
CHLORIDE SERPL-SCNC: 102 MMOL/L (ref 98–107)
CO2 SERPL-SCNC: 33 MMOL/L (ref 21–32)
COLOR UR: YELLOW
CREAT SERPL-MCNC: 0.8 MG/DL (ref 0.6–1.3)
EOSINOPHIL NFR BLD: 0.4 %
GLUCOSE SERPL-MCNC: 126 MG/DL (ref 70–99)
GRANULOCYTES NFR BLD MANUAL: 80.9 %
HCT VFR BLD CALC: 43.2 % (ref 37–47)
HGB BLD-MCNC: 14.7 GM/DL (ref 12–15)
KETONES UR STRIP-MCNC: (no result) MG/DL
LIPASE: 77 U/L (ref 73–393)
LYMPHOCYTES # BLD: 0.6 THOU/UL (ref 0.8–5.3)
LYMPHOCYTES NFR BLD AUTO: 10.9 %
MCH RBC QN AUTO: 33.6 PG (ref 26–34)
MCHC RBC AUTO-ENTMCNC: 34.1 G/DL (ref 28–37)
MCV RBC: 98.6 FL (ref 80–100)
MONOCYTES NFR BLD: 7.1 %
MPV: 7.3 FL. (ref 7.2–11.1)
NEUTROPHILS # BLD: 4.6 THOU/UL (ref 1.6–8.1)
NUCLEATED RBCS: 0 /100WBC
PLATELET COUNT*: 170 THOU/UL (ref 150–400)
POTASSIUM SERPL-SCNC: 3.6 MMOL/L (ref 3.5–5.1)
PROT SERPL-MCNC: 6.3 G/DL (ref 6.4–8.2)
PROT UR QL STRIP: NEGATIVE
RBC # BLD AUTO: 4.38 MIL/UL (ref 4.2–5)
RBC # UR STRIP: (no result) /UL
RDW-CV: 15.3 % (ref 10.5–14.5)
SODIUM SERPL-SCNC: 141 MMOL/L (ref 136–145)
SP GR UR STRIP: 1.01 (ref 1–1.03)
URINE CLARITY: CLEAR
URINE GLUCOSE-RANDOM: NEGATIVE
URINE LEUKOCYTES-REFLEX: NEGATIVE
URINE NITRITE-REFLEX: NEGATIVE
UROBILINOGEN UR STRIP-ACNC: 1 E.U./DL (ref 0.2–1)
WBC # BLD AUTO: 5.7 THOU/UL (ref 4–11)

## 2020-10-23 ENCOUNTER — HOSPITAL ENCOUNTER (OUTPATIENT)
Dept: HOSPITAL 96 - M.ERS | Age: 55
Setting detail: OBSERVATION
LOS: 1 days | Discharge: HOME | End: 2020-10-24
Attending: INTERNAL MEDICINE | Admitting: INTERNAL MEDICINE
Payer: COMMERCIAL

## 2020-10-23 VITALS — DIASTOLIC BLOOD PRESSURE: 73 MMHG | SYSTOLIC BLOOD PRESSURE: 126 MMHG

## 2020-10-23 VITALS — SYSTOLIC BLOOD PRESSURE: 133 MMHG | DIASTOLIC BLOOD PRESSURE: 87 MMHG

## 2020-10-23 VITALS — DIASTOLIC BLOOD PRESSURE: 78 MMHG | SYSTOLIC BLOOD PRESSURE: 160 MMHG

## 2020-10-23 VITALS — SYSTOLIC BLOOD PRESSURE: 109 MMHG | DIASTOLIC BLOOD PRESSURE: 70 MMHG

## 2020-10-23 VITALS — DIASTOLIC BLOOD PRESSURE: 62 MMHG | SYSTOLIC BLOOD PRESSURE: 108 MMHG

## 2020-10-23 VITALS — SYSTOLIC BLOOD PRESSURE: 83 MMHG | DIASTOLIC BLOOD PRESSURE: 40 MMHG

## 2020-10-23 VITALS — SYSTOLIC BLOOD PRESSURE: 159 MMHG | DIASTOLIC BLOOD PRESSURE: 89 MMHG

## 2020-10-23 VITALS — BODY MASS INDEX: 26.68 KG/M2 | HEIGHT: 65.98 IN | WEIGHT: 166 LBS

## 2020-10-23 VITALS — SYSTOLIC BLOOD PRESSURE: 102 MMHG | DIASTOLIC BLOOD PRESSURE: 50 MMHG

## 2020-10-23 VITALS — DIASTOLIC BLOOD PRESSURE: 84 MMHG | SYSTOLIC BLOOD PRESSURE: 135 MMHG

## 2020-10-23 DIAGNOSIS — I95.9: ICD-10-CM

## 2020-10-23 DIAGNOSIS — Z79.899: ICD-10-CM

## 2020-10-23 DIAGNOSIS — E44.1: ICD-10-CM

## 2020-10-23 DIAGNOSIS — R11.10: ICD-10-CM

## 2020-10-23 DIAGNOSIS — I48.91: ICD-10-CM

## 2020-10-23 DIAGNOSIS — F32.9: ICD-10-CM

## 2020-10-23 DIAGNOSIS — I25.10: ICD-10-CM

## 2020-10-23 DIAGNOSIS — F12.20: ICD-10-CM

## 2020-10-23 DIAGNOSIS — Z87.891: ICD-10-CM

## 2020-10-23 DIAGNOSIS — J44.9: ICD-10-CM

## 2020-10-23 DIAGNOSIS — K31.84: ICD-10-CM

## 2020-10-23 DIAGNOSIS — F41.9: ICD-10-CM

## 2020-10-23 DIAGNOSIS — Z20.828: ICD-10-CM

## 2020-10-23 DIAGNOSIS — G89.29: ICD-10-CM

## 2020-10-23 DIAGNOSIS — E11.43: ICD-10-CM

## 2020-10-23 DIAGNOSIS — G62.9: ICD-10-CM

## 2020-10-23 DIAGNOSIS — R55: Primary | ICD-10-CM

## 2020-10-23 DIAGNOSIS — K59.00: ICD-10-CM

## 2020-10-23 DIAGNOSIS — R33.9: ICD-10-CM

## 2020-10-23 LAB
ABSOLUTE BASOPHILS: 0 THOU/UL (ref 0–0.2)
ABSOLUTE EOSINOPHILS: 0 THOU/UL (ref 0–0.7)
ABSOLUTE MONOCYTES: 0.4 THOU/UL (ref 0–1.2)
ALBUMIN SERPL-MCNC: 2.8 G/DL (ref 3.4–5)
ALP SERPL-CCNC: 71 U/L (ref 46–116)
ALT SERPL-CCNC: 34 U/L (ref 30–65)
ANION GAP SERPL CALC-SCNC: 6 MMOL/L (ref 7–16)
ANION GAP SERPL CALC-SCNC: 7 MMOL/L (ref 7–16)
APTT BLD: 23.1 SECONDS (ref 25–31.3)
AST SERPL-CCNC: 33 U/L (ref 15–37)
BASOPHILS NFR BLD AUTO: 0.8 %
BILIRUB SERPL-MCNC: 0.6 MG/DL
BILIRUB UR-MCNC: NEGATIVE MG/DL
BUN SERPL-MCNC: 8 MG/DL (ref 7–18)
BUN SERPL-MCNC: 8 MG/DL (ref 7–18)
CALCIUM SERPL-MCNC: 8.3 MG/DL (ref 8.5–10.1)
CALCIUM SERPL-MCNC: 8.6 MG/DL (ref 8.5–10.1)
CHLORIDE SERPL-SCNC: 102 MMOL/L (ref 98–107)
CHLORIDE SERPL-SCNC: 104 MMOL/L (ref 98–107)
CO2 SERPL-SCNC: 30 MMOL/L (ref 21–32)
CO2 SERPL-SCNC: 32 MMOL/L (ref 21–32)
COLOR UR: YELLOW
CREAT SERPL-MCNC: 0.9 MG/DL (ref 0.6–1.3)
CREAT SERPL-MCNC: 1 MG/DL (ref 0.6–1.3)
EOSINOPHIL NFR BLD: 1 %
GLUCOSE SERPL-MCNC: 106 MG/DL (ref 70–99)
GLUCOSE SERPL-MCNC: 78 MG/DL (ref 70–99)
GRANULOCYTES NFR BLD MANUAL: 62.9 %
HCT VFR BLD CALC: 41.2 % (ref 37–47)
HGB BLD-MCNC: 13.9 GM/DL (ref 12–15)
INR PPP: 1
KETONES UR STRIP-MCNC: NEGATIVE MG/DL
LIPASE: 112 U/L (ref 73–393)
LYMPHOCYTES # BLD: 1.3 THOU/UL (ref 0.8–5.3)
LYMPHOCYTES NFR BLD AUTO: 26.7 %
MAGNESIUM SERPL-MCNC: 1.7 MG/DL (ref 1.8–2.4)
MAGNESIUM SERPL-MCNC: 2.4 MG/DL (ref 1.8–2.4)
MCH RBC QN AUTO: 33.7 PG (ref 26–34)
MCHC RBC AUTO-ENTMCNC: 33.7 G/DL (ref 28–37)
MCV RBC: 100 FL (ref 80–100)
MONOCYTES NFR BLD: 8.6 %
MPV: 7.6 FL. (ref 7.2–11.1)
NEUTROPHILS # BLD: 3 THOU/UL (ref 1.6–8.1)
NUCLEATED RBCS: 0 /100WBC
PHOSPHATE SERPL-MCNC: 2.7 MG/DL (ref 2.5–4.9)
PLATELET COUNT*: 147 THOU/UL (ref 150–400)
POTASSIUM SERPL-SCNC: 2.8 MMOL/L (ref 3.5–5.1)
POTASSIUM SERPL-SCNC: 3 MMOL/L (ref 3.5–5.1)
POTASSIUM SERPL-SCNC: 5.2 MMOL/L (ref 3.5–5.1)
PROT SERPL-MCNC: 6 G/DL (ref 6.4–8.2)
PROT UR QL STRIP: NEGATIVE
PROTHROMBIN TIME: 10.6 SECONDS (ref 9.2–11.5)
RBC # BLD AUTO: 4.12 MIL/UL (ref 4.2–5)
RBC # UR STRIP: NEGATIVE /UL
RDW-CV: 15.6 % (ref 10.5–14.5)
SODIUM SERPL-SCNC: 140 MMOL/L (ref 136–145)
SODIUM SERPL-SCNC: 141 MMOL/L (ref 136–145)
SP GR UR STRIP: 1.01 (ref 1–1.03)
URINE CLARITY: CLEAR
URINE GLUCOSE-RANDOM: NEGATIVE
URINE LEUKOCYTES-REFLEX: NEGATIVE
URINE NITRITE-REFLEX: NEGATIVE
UROBILINOGEN UR STRIP-ACNC: 2 E.U./DL (ref 0.2–1)
WBC # BLD AUTO: 4.7 THOU/UL (ref 4–11)

## 2020-10-23 NOTE — NUR
PATIENT RESTING IN BED. PATIENT IS UP STANDBY ASSIST. PATIENT IS POSITIVE FOR
ORTHOSTATIC HYPOTENSION. PATIENT SEEN BY CARDIOLOGY AND PULMONOLOGY TODAY.
PATIENT DENIES ANY PAIN. PATIENT IS ON ROOM AIR. BREATHING TREATMENTS STARTED,
ONE TIME DOSE SOLUMEDROL GIVEN AS ORDERED. PATIENT DENIES ANY NEEDS AT THIS
TIME. CALL LIGHT WITHIN REACH.

## 2020-10-23 NOTE — CON
[address]  [address]                    CONSULTATION                  
_______________________________________________________________________________
 
Name:       SANTOS MCFARLANE                    Room:           [address] 
MSherwinR.#:  [id number]      Account #:      [id number]  
Admission:  10/23/20     Attend Phys:    Maksim Castle MD 
Discharge:               Date of Birth:  [birth year]  
         Report #: [id number]
                                                                     [id number]  
_______________________________________________________________________________
THIS REPORT FOR:  //name//                      
 
cc:  Liane Durand Anna S. DO                                                    ~
DATE OF SERVICE:  10/23/2020
 
 
CONSULT REQUESTED BY:  Jesús Walden DO.
 
INDICATION FOR CONSULTATION:  Shortness of breath and cough.
 
HISTORY OF PRESENT ILLNESS:  A 55-year-old female, extensive history of smoking,
COPD as well as lung cancer, non-small cell, for which she has had surgery in
the past.  The patient also has a longstanding history of back pain and has had
significant orthostatic hypotension.  The patient is on Florinef as well as
midodrine long-term for orthostatic hypotension.  This time the patient is again
admitted with a syncopal episode.  She had fallen down and passed out, had
injury to her left leg.  She does have significant electrolyte abnormalities
including markedly decreased potassium level and a low magnesium level as well. 
She does have shortness of breath as well as a cough, however, does not state
that this is significantly worse than her baseline.  She has some white sputum
production, but again does not state that this is more than her baseline.  She
does not have chest pain.  She does not have upper respiratory complaints. 
There is minor swelling of lower extremities, she has some pain in the left leg,
which she attributes to the injury she has had earlier.
 
REVIEW OF SYSTEMS:  For 12 points is negative except as mentioned above.
 
PAST MEDICAL HISTORY:  Carcinoma lung.  She had surgery for this.  I do not have
records available, but her history is consistent with non-small cell carcinoma
in the right lung, status post resection in 2018.  COPD, not on long-term
oxygen, not on long-term prednisone.  Hysterectomy, vaginal and rectal fistula
repair, cholecystectomy, tubal ligation, anxiety, left knee replacement,
insomnia, orthostatic hypotension requiring both midodrine as well as Florinef,
longstanding back pain requiring stimulator, urine retention long-term, and
peripheral neuropathy.  Last available echocardiogram shows a normal left
ventricular ejection fraction without significant elevation in right heart
pressures.
 
SOCIAL HISTORY:  There is an extensive history of smoking more than 2 packs a
day for more than 4 decades has now discontinued.  No known history of heavy
alcohol use or illegal drug use.
 
CURRENT MEDICATIONS:  List in [employer] reviewed.
 
HOME MEDICATIONS:  List also in [employer] reviewed.
 
 
 
[place] [address]                    CONSULTATION                  
_______________________________________________________________________________
 
Name:       SANTOS MCFARLANE                    Room:           [address] 
M.R.#:  [id number]      Account #:      [id number]  
Admission:  10/23/20     Attend Phys:    Maksim Castle MD 
Discharge:               Date of Birth:  [birth year]  
         Report #: [id number]
                                                                     [id number]  
_______________________________________________________________________________
 
FAMILY HISTORY:  There is no pertinent family history known at this time.
 
PHYSICAL EXAMINATION:
GENERAL:  She is alert, awake and oriented, does not appear in distress at this
time.
VITAL SIGNS:  Pulse of 95 and a blood pressure of 102/50, saturating 98% on 2
liters nasal cannula with a respiratory rate of 16-17, afebrile with a
temperature of 36.5.
HEENT:  Head is  normocephalic and atraumatic.  Pupils are equal and reactive. 
There is no throat erythema.  There is no thrush in her throat.
NECK:  Does not show raised JVP, asymmetry, mass or lymph nodes.
CHEST:  Symmetrical expansion on inspection and palpation.  On auscultation,
breath sounds are bilaterally equal, but decreased.  Expirations are prolonged. 
There are end-expiratory wheezes noted.
HEART:  Regular.  There is no murmur.
ABDOMEN:  Soft and nontender.
EXTREMITIES:  Lower extremities show trace edema bilaterally.  There is some
bruising on her left leg.
 
LABORATORY DATA:  The patient's chest x-rays noted.  There are some increase in
interstitial markings are more on the left side, but these are more likely to be
chronic changes than a new infiltrate.  I see similar findings on the patient's
previous chest x-rays as well.  The patient's CT head as well as  carotid artery
Dopplers are in [facility] and these are reviewed.  The patient's lab work, which
does show severe hypokalemia as well as  hypomagnesemia in [facility] reviewed. 
COVID-19 screen was negative.
 
ASSESSMENT AND PLAN:
1.  Chronic obstructive pulmonary disease.  There are occasional end-expiratory
wheezes on her exam; however, I do not get a definite sense that she is in an
exacerbation and she does not describe her respiratory symptoms to be
significantly worse than her baseline.  Therefore, for now, I only ordered
nebulized bronchodilators and I ordered one dose of Solu-Medrol depending on her
response.  If indicated, then a prednisone taper or more Solu-Medrol could be
considered tomorrow.  The patient may be hypoxemic, while asleep.  It will also
be possible that she has underlying obstructive sleep apnea, I recommend these
be evaluated as an outpatient if not already done.
2.  Interstitial infiltrates.  There are some interstitial infiltrates noted in
the left lung field, but this is not significantly changed compared with the
patient's previous chest x-rays, that appears more likely to me that this is
secondary to chronic scarring rather than pneumonia.  I do not feel that I have
enough to start antibiotics at this time; however, I will order a sputum culture
in case the patient's respiratory status worsens certainly antibiotics can be
considered.
3.  Orthostatic hypotension/syncopal episode.  Defer management to the [address]  [address]                    CONSULTATION                  
_______________________________________________________________________________
 
Name:       SANTOS MCFARLANE                    Room:           [address] [place] MOORE#:  [id number]      Account #:      [id number]  
Admission:  10/23/20     Attend Phys:    Maksim Castle MD 
Discharge:               Date of Birth:  [birth year]  
         Report #: [id number]
                                                                     [id number]  
_______________________________________________________________________________
service and Cardiology.
4.  Severe hypokalemia and hypomagnesemia.  Note that the patient is on Florinef
long-term, Florinef does drop potassium levels.  The patient may therefore
benefit from long-term close monitoring of her potassium and magnesium levels
and replacement as indicated.
5.  Deep vein thrombosis prophylaxis.  Suggest considering subcutaneous Lovenox.
 
Thanks for this consultation.
 
 
 
 
 
 
 
 
 
 
 
 
 
 
 
 
 
 
 
 
 
 
 
 
 
 
 
 
 
 
 
 
 
 
 
 
                       
                                        By:                                
                 
D: 10/23/20 1509_______________________________________
T: 10/23/20 1554Acarlos Banks MD                 /nt

## 2020-10-23 NOTE — 2DMMODE
[place] [address]
Phone:  [phone] 2 D/M-MODE ECHOCARDIOGRAM     
_______________________________________________________________________________
 
Name:         SANTOS MCFARLANE                   Room:          [address]
MSherwinR.#:    [id number]     Account #:     [id number]  
Admission:    10/23/20    Attend Phys:   Maksim Castle, 
Discharge:                Date of Birth: [birth year]  
Date of Service: 10/23/20 1533  Report #:      [id number]
        [id number]
_______________________________________________________________________________
THIS REPORT FOR:
 
cc:  Liane Durand,Liane Mcclain,Hans REYNA MD [facility]       
                                                                       ~
 
--------------- APPROVED REPORT --------------
 
 
Study performed:  10/23/2020 13:37:57
 
EXAM: Comprehensive 2D, Doppler, and color-flow 
Echocardiogram 
Patient Location: Bedside   
 
      BSA:         1.85
HR: 94 bpm BP:          160/78 mmHg 
 
Other Information 
Study Quality: Adequate
 
Indications
CAD
Syncope 
 
2D Dimensions
IVSd:  15.75 (7-11mm) LVOT Diam:  20.64 (18-24mm) 
LVDd:  39.90 mm  
PWd:  10.73 (7-11mm) Ascending Ao:  28.40 (22-36mm)
LVDs:  29.47 (25-40mm) 
Aortic Root:  22.05 mm 
 
Volumes
Left Atrial Volume (Systole) 
    LA ESV Index:  17.90 mL/m2
 
Aortic Valve
AoV Peak Manoj.:  1.36 m/s 
AO Peak Gr.:  7.38 mmHg  LVOT Max P.22 mmHg
AO Mean Gr.:  4.00 mmHg  LVOT Mean P.80 mmHg
    LVOT Max V:  1.14 m/s
AO V2 VTI:  23.50 cm  LVOT Mean V:  0.79 m/s
MARY (VTI):  3.00 cm2  LVOT V1 VTI:  21.03 cm
 
Mitral Valve
 
 
[place] [address]
Phone:  [phone]                     2 D/M-MODE ECHOCARDIOGRAM     
_______________________________________________________________________________
 
Name:         SANTOS MCFARLANE                   Room:          [address]
M.R.#:    [id number]     Account #:     [id number]  
Admission:    10/23/20    Attend Phys:   Maksim Castle, 
Discharge:                Date of Birth: [birth year]  
Date of Service: 10/23/20 1533  Report #:      [id number]
        [id number]
_______________________________________________________________________________
    E/A Ratio:  1.36
    MV Decel. Time:  111.88 ms
MV E Max Manoj.:  0.56 m/s 
MV PHT:  32.45 ms  
MVA (PHT):  6.78 cm2  
 
TDI
E/Lateral E':  5.60 E/Medial E':  5.60
   Medial E' Manoj.:  0.10 m/s
   Lateral E' Manoj.:  0.10 m/s
 
Pulmonary Valve
PV Peak Manoj.:  0.85 m/s PV Peak Gr.:  2.89 mmHg
 
Tricuspid Valve
    RAP Estimate:  5.00 mmHg
TR Peak Gr.:  15.94 mmHg RVSP:  20.94 mmHg
    PA Pressure:  20.94 mmHg
 
Left Ventricle
The left ventricle is normal size. There is normal LV segmental wall 
motion. There is normal left ventricular wall thickness. Left 
ventricular systolic function is normal. The left ventricular 
ejection fraction is within the normal range. LVEF is 60-65%. The 
left ventricular diastolic function is normal.
 
Right Ventricle
The right ventricle is normal size. The right ventricular systolic 
function is normal.
 
Atria
The left atrium size is normal. The right atrium size is 
normal.
 
Aortic Valve
The aortic valve is normal in structure. No aortic regurgitation is 
present. There is no aortic valvular stenosis.
 
Mitral Valve
The mitral valve is normal in structure. There is no mitral valve 
regurgitation noted. No evidence of mitral valve stenosis.
 
Tricuspid Valve
The tricuspid valve is normal in structure. Trace tricuspid 
regurgitation.
 
 
 
[place] [address]
Phone:  [phone] 2 D/M-MODE ECHOCARDIOGRAM     
_______________________________________________________________________________
 
Name:         SANTOS MCFARLANE                   [address]          [address]    [id number]     Account #:     [id number]  
Admission:    10/23/20    Attend Phys:   Maksim Castle, 
Discharge:                Date of Birth: [birth year]  
Date of Service: 10/23/20 1533  Report #:      [id number]
        [id number]
_______________________________________________________________________________
Pulmonic Valve
The pulmonary valve is normal in structure. There is no pulmonic 
valvular regurgitation.
 
Great Vessels
The aortic root is normal in size. IVC is normal in size and 
collapses >50% with inspiration.
 
Pericardium
There is no pericardial effusion.
 
<Conclusion>
The left ventricle is normal size.
Left ventricular systolic function is normal.
The left ventricular ejection fraction is within the normal 
range.
LVEF is 60-65%.
The left ventricular diastolic function is normal.
The right ventricle is normal size.
The left atrium size is normal.
The aortic valve is normal in structure.
The mitral valve is normal in structure.
The tricuspid valve is normal in structure.
IVC is normal in size and collapses >50% with inspiration.
There is no pericardial effusion.
There is normal LV segmental wall motion.
 
 
 
 
 
 
 
 
 
 
 
 
 
 
 
 
 
 
  <ELECTRONICALLY SIGNED>
                                           By: Hans Watts MD, FACC     
  10/23/20     1533
D: 10/23/20 1533   _____________________________________
T: 10/23/20 1533   Hans Watts MD, FACC       /INF

## 2020-10-23 NOTE — NUR
CM SPOKE TO THE PT TO DISCUSS HER HOME SITUATION, DISCHARGE PLANNING, ADN TO
INFORM OF THE ROLE OF CM. PT A&O, AND INDEPENDENT WITH ADL'S. PT INFORMS THAT
SHE IS LEGALLY BLIND.  PT RESIDES AT HOME WITH SPOUSE AND HE DOES ALL COOKING,
CLEANING, AND DRIVING. PT HAS 0 HX OF HH OR SNF. PT INFORMS THAT SHE USES 0
DME. HOWEVER REVIEW OD PT'S CHART INFORMS THAT DURING HER PREVIOUS ADMISSION
SHE WAS USING A WALKER FOR MOBILITY. PT CURRENTLY ON 2L OXYGEN. PT DOES NOT
USE HOME O2. CM RECIEVED CONSULT FOR PLACEMENT FOR THIS PT. CM TO DISCUSS THIS
WITH HOSPITALIST AS WELL AS NEED FOR PT AND OT EVALS FOR PT. CM WILL REMAIN
AVAILABLE TO ASSIST AND FOLLOW AS NEEDED.

## 2020-10-23 NOTE — EKG
[place] [address]
Phone:  [phone]                     ELECTROCARDIOGRAM REPORT      
_______________________________________________________________________________
 
Name:         MAEVESANTOS A                   Room:          [address]#:    [id number]     Account #:     [id number]  
Admission:    10/23/20    Attend Phys:   Maksim Castle, 
Discharge:                Date of Birth: [birth year]  
Date of Service: 10/23/20 0235  Report #:      [id number]
        [id number]
_______________________________________________________________________________
THIS REPORT FOR:  //name//                      
 
                         [facility] ED
                                       
Test Date:    2020-10-23               Test Time:    02:35:03
Pat Name:     SANTOS MCFARLANE                Department:   
Patient ID:   [id number]            Room:         [facility]
Gender:       F                        Technician:   FL
:          1965               Requested By: Timothy Taveras
Order Number: [id number] MD:   Hans Watts
                                 Measurements
Intervals                              Axis          
Rate:         107                      P:            62
NH:           157                      QRS:          -34
QRSD:         107                      T:            81
QT:           360                                    
QTc:          481                                    
                           Interpretive Statements
Sinus tachycardia
RSR' in V1 or V2, probably normal variant
Inferior infarct, old
Baseline wander in lead(s) II,III,aVR,aVL,aVF
Compared to ECG 10/01/2020 21:03:01
Myocardial infarct finding now present
Left-axis deviation no longer present
Electronically Signed On 10- 15:13:09 CDT by Hans Watts
[email or web address]
 
 
 
 
 
 
 
 
 
 
 
 
 
 
 
 
 
  <ELECTRONICALLY SIGNED>
                                           By: Hans Watts MD, [facility]     
  10/23/20     1513
D: 10/23/20 0235   _____________________________________
T: 10/23/20 023   Hans Watts MD, [facility]       /EPI

## 2020-10-24 VITALS — SYSTOLIC BLOOD PRESSURE: 126 MMHG | DIASTOLIC BLOOD PRESSURE: 80 MMHG

## 2020-10-24 VITALS — DIASTOLIC BLOOD PRESSURE: 80 MMHG | SYSTOLIC BLOOD PRESSURE: 126 MMHG

## 2020-10-24 VITALS — DIASTOLIC BLOOD PRESSURE: 60 MMHG | SYSTOLIC BLOOD PRESSURE: 100 MMHG

## 2020-10-24 LAB
EST. AVERAGE GLUCOSE BLD GHB EST-MCNC: 91 MG/DL
GLYCOHEMOGLOBIN (HGB A1C): 4.8 % (ref 4.8–5.6)

## 2020-10-24 NOTE — NUR
ASSUMED CARE OF PT AT 1900. PT IS ALERT AND ORIENTED. VSS. PERRLA. NO
COMPLAINTS OF PAIN. UP WITH 1 ASSIST. PT IS IN SINUS RYTHM ON THE TELEMETRY.
PT IS RESTING COMFORTABLY IN BED. RESPIRATIONS ARE EVEN AND NONLABORED. WILL
CONTINUE TO MONITOR PT.

## 2020-10-27 NOTE — NUR
PT. [facility] ON [facility] PRIOR TO O.T. EVAL.  PLEASE ORDER
FURTHER O.T. SERVICES IF NEEDED.

## 2020-11-20 ENCOUNTER — HOSPITAL ENCOUNTER (INPATIENT)
Dept: HOSPITAL 96 - M.ERS | Age: 55
LOS: 2 days | Discharge: HOME | DRG: 640 | End: 2020-11-22
Attending: INTERNAL MEDICINE | Admitting: INTERNAL MEDICINE
Payer: COMMERCIAL

## 2020-11-20 VITALS — SYSTOLIC BLOOD PRESSURE: 125 MMHG | DIASTOLIC BLOOD PRESSURE: 70 MMHG

## 2020-11-20 VITALS — HEIGHT: 65.98 IN | WEIGHT: 169 LBS | BODY MASS INDEX: 27.16 KG/M2

## 2020-11-20 VITALS — DIASTOLIC BLOOD PRESSURE: 73 MMHG | SYSTOLIC BLOOD PRESSURE: 135 MMHG

## 2020-11-20 VITALS — SYSTOLIC BLOOD PRESSURE: 145 MMHG | DIASTOLIC BLOOD PRESSURE: 83 MMHG

## 2020-11-20 VITALS — SYSTOLIC BLOOD PRESSURE: 115 MMHG | DIASTOLIC BLOOD PRESSURE: 74 MMHG

## 2020-11-20 VITALS — DIASTOLIC BLOOD PRESSURE: 75 MMHG | SYSTOLIC BLOOD PRESSURE: 117 MMHG

## 2020-11-20 DIAGNOSIS — R65.11: ICD-10-CM

## 2020-11-20 DIAGNOSIS — N39.0: ICD-10-CM

## 2020-11-20 DIAGNOSIS — G47.00: ICD-10-CM

## 2020-11-20 DIAGNOSIS — G62.9: ICD-10-CM
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
## 2020-11-20 DIAGNOSIS — K59.00: ICD-10-CM

## 2020-11-20 DIAGNOSIS — E87.6: Primary | ICD-10-CM

## 2020-11-20 DIAGNOSIS — J44.9: ICD-10-CM

## 2020-11-20 DIAGNOSIS — M54.9: ICD-10-CM

## 2020-11-20 DIAGNOSIS — Z87.891: ICD-10-CM

## 2020-11-20 DIAGNOSIS — Z79.899: ICD-10-CM

## 2020-11-20 DIAGNOSIS — Z90.49: ICD-10-CM

## 2020-11-20 DIAGNOSIS — R25.1: ICD-10-CM

## 2020-11-20 DIAGNOSIS — G89.29: ICD-10-CM

## 2020-11-20 DIAGNOSIS — F32.9: ICD-10-CM

## 2020-11-20 DIAGNOSIS — R31.9: ICD-10-CM

## 2020-11-20 DIAGNOSIS — Z20.828: ICD-10-CM

## 2020-11-20 DIAGNOSIS — Z96.652: ICD-10-CM

## 2020-11-20 DIAGNOSIS — G25.81: ICD-10-CM

## 2020-11-20 DIAGNOSIS — Z90.710: ICD-10-CM

## 2020-11-20 DIAGNOSIS — G93.41: ICD-10-CM

## 2020-11-20 DIAGNOSIS — C34.90: ICD-10-CM

## 2020-11-20 DIAGNOSIS — I95.1: ICD-10-CM

## 2020-11-20 DIAGNOSIS — T43.595A: ICD-10-CM

## 2020-11-20 DIAGNOSIS — F41.1: ICD-10-CM

## 2020-11-20 LAB
ABSOLUTE BASOPHILS: 0 THOU/UL (ref 0–0.2)
ABSOLUTE EOSINOPHILS: 0 THOU/UL (ref 0–0.7)
ABSOLUTE MONOCYTES: 0.9 THOU/UL (ref 0–1.2)
ALBUMIN SERPL-MCNC: 3.5 G/DL (ref 3.4–5)
ALP SERPL-CCNC: 278 U/L (ref 46–116)
ALT SERPL-CCNC: 177 U/L (ref 30–65)
ANION GAP SERPL CALC-SCNC: 8 MMOL/L (ref 7–16)
ANION GAP SERPL CALC-SCNC: 9 MMOL/L (ref 7–16)
APTT BLD: 24.8 SECONDS (ref 25–31.3)
AST SERPL-CCNC: 98 U/L (ref 15–37)
BACTERIA-REFLEX: (no result) /HPF
BASOPHILS NFR BLD AUTO: 0.4 %
BILIRUB SERPL-MCNC: 0.8 MG/DL
BILIRUB UR-MCNC: (no result) MG/DL
BUN SERPL-MCNC: 14 MG/DL (ref 7–18)
BUN SERPL-MCNC: 15 MG/DL (ref 7–18)
CALCIUM SERPL-MCNC: 8.8 MG/DL (ref 8.5–10.1)
CALCIUM SERPL-MCNC: 9.3 MG/DL (ref 8.5–10.1)
CHLORIDE SERPL-SCNC: 100 MMOL/L (ref 98–107)
CHLORIDE SERPL-SCNC: 101 MMOL/L (ref 98–107)
CO2 SERPL-SCNC: 29 MMOL/L (ref 21–32)
CO2 SERPL-SCNC: 31 MMOL/L (ref 21–32)
COLOR UR: (no result)
CREAT SERPL-MCNC: 0.7 MG/DL (ref 0.6–1.3)
CREAT SERPL-MCNC: 0.8 MG/DL (ref 0.6–1.3)
EOSINOPHIL NFR BLD: 0.1 %
GLUCOSE SERPL-MCNC: 84 MG/DL (ref 70–99)
GLUCOSE SERPL-MCNC: 86 MG/DL (ref 70–99)
GRANULOCYTES NFR BLD MANUAL: 83.6 %
HCT VFR BLD CALC: 36.9 % (ref 37–47)
HGB BLD-MCNC: 12.5 GM/DL (ref 12–15)
INR PPP: 1
KETONES UR STRIP-MCNC: NEGATIVE MG/DL
LYMPHOCYTES # BLD: 0.9 THOU/UL (ref 0.8–5.3)
LYMPHOCYTES NFR BLD AUTO: 7.8 %
MAGNESIUM SERPL-MCNC: 1.8 MG/DL (ref 1.8–2.4)
MCH RBC QN AUTO: 32.8 PG (ref 26–34)
MCHC RBC AUTO-ENTMCNC: 33.9 G/DL (ref 28–37)
MCV RBC: 96.8 FL (ref 80–100)
MONOCYTES NFR BLD: 8.1 %
MPV: 8.1 FL. (ref 7.2–11.1)
MUCUS: (no result) STRN/LPF
NEUTROPHILS # BLD: 9.3 THOU/UL (ref 1.6–8.1)
NT-PRO BRAIN NAT PEPTIDE: 453 PG/ML (ref ?–300)
NUCLEATED RBCS: 0 /100WBC
PHOSPHATE SERPL-MCNC: 3.6 MG/DL (ref 2.5–4.9)
PLATELET COUNT*: 346 THOU/UL (ref 150–400)
POTASSIUM SERPL-SCNC: 3 MMOL/L (ref 3.5–5.1)
POTASSIUM SERPL-SCNC: 3.3 MMOL/L (ref 3.5–5.1)
PROT SERPL-MCNC: 7.2 G/DL (ref 6.4–8.2)
PROT UR QL STRIP: (no result)
PROTHROMBIN TIME: 10.9 SECONDS (ref 9.2–11.5)
RBC # BLD AUTO: 3.82 MIL/UL (ref 4.2–5)
RBC # UR STRIP: (no result) /UL
RBC #/AREA URNS HPF: (no result) /HPF (ref 0–2)
RDW-CV: 14.2 % (ref 10.5–14.5)
SODIUM SERPL-SCNC: 139 MMOL/L (ref 136–145)
SODIUM SERPL-SCNC: 139 MMOL/L (ref 136–145)
SP GR UR STRIP: >= 1.03 (ref 1–1.03)
SQUAMOUS: (no result) /LPF (ref 0–3)
URINE CLARITY: CLEAR
URINE GLUCOSE-RANDOM: NEGATIVE
URINE LEUKOCYTES-REFLEX: NEGATIVE
URINE NITRITE-REFLEX: POSITIVE
UROBILINOGEN UR STRIP-ACNC: 1 E.U./DL (ref 0.2–1)
WBC # BLD AUTO: 11.1 THOU/UL (ref 4–11)

## 2020-11-20 NOTE — EKG
[place] [address]
Phone:  [phone]                     ELECTROCARDIOGRAM REPORT      
_______________________________________________________________________________
 
Name:         SANTOS MCFARLANE                   Room:          [place] [address]   ADM IN 
[facility]#:    [id number]     Account #:     [id number]  
Admission:    20    Attend Phys:   Jesús Walden
Discharge:                Date of Birth: [birth year]  
Date of Service: 20 1018  Report #:      [id number]
        [id number]
_______________________________________________________________________________
THIS REPORT FOR:  //name//                      
 
                         [facility] ED
                                       
Test Date:    2020               Test Time:    10:18:26
Pat Name:     SANTOS MCFARLANE                Department:   
Patient ID:   [id number]            Room:         [facility]
Gender:       F                        Technician:   ANGEL
:          1965               Requested By: Rogelio Fields
Order Number: [id number] MD:   South Shi
                                 Measurements
Intervals                              Axis          
Rate:         98                       P:            59
AL:           158                      QRS:          -20
QRSD:         104                      T:            66
QT:           367                                    
QTc:          469                                    
                           Interpretive Statements
Sinus rhythm
Probable left atrial enlargement
Borderline left axis deviation
RSR' in V1 or V2, right VCD or RVH
Baseline wander in lead(s) V2
Compared to ECG 10/23/2020 02:35:03
Sinus tachycardia no longer present
Electronically Signed On 2020 13:07:22 CST by South Shi
[email or web address]
 
 
 
 
 
 
 
 
 
 
 
 
 
 
 
 
 
  <ELECTRONICALLY SIGNED>
                                           By: South Shi MD, FACC      
  20     1307
D: 20 1018   _____________________________________
T: 20 1018   South Shi MD, [facility]        /EPI

## 2020-11-21 VITALS — SYSTOLIC BLOOD PRESSURE: 92 MMHG | DIASTOLIC BLOOD PRESSURE: 49 MMHG

## 2020-11-21 VITALS — DIASTOLIC BLOOD PRESSURE: 61 MMHG | SYSTOLIC BLOOD PRESSURE: 105 MMHG

## 2020-11-21 VITALS — DIASTOLIC BLOOD PRESSURE: 75 MMHG | SYSTOLIC BLOOD PRESSURE: 123 MMHG

## 2020-11-21 VITALS — SYSTOLIC BLOOD PRESSURE: 119 MMHG | DIASTOLIC BLOOD PRESSURE: 66 MMHG

## 2020-11-21 VITALS — DIASTOLIC BLOOD PRESSURE: 64 MMHG | SYSTOLIC BLOOD PRESSURE: 108 MMHG

## 2020-11-22 VITALS — DIASTOLIC BLOOD PRESSURE: 56 MMHG | SYSTOLIC BLOOD PRESSURE: 108 MMHG

## 2020-11-22 VITALS — SYSTOLIC BLOOD PRESSURE: 114 MMHG | DIASTOLIC BLOOD PRESSURE: 67 MMHG

## 2020-11-22 VITALS — SYSTOLIC BLOOD PRESSURE: 108 MMHG | DIASTOLIC BLOOD PRESSURE: 56 MMHG

## 2020-11-22 VITALS — SYSTOLIC BLOOD PRESSURE: 121 MMHG | DIASTOLIC BLOOD PRESSURE: 75 MMHG

## 2020-11-27 ENCOUNTER — HOSPITAL ENCOUNTER (EMERGENCY)
Dept: HOSPITAL 96 - M.ERS | Age: 55
Discharge: HOME | End: 2020-11-27
Payer: COMMERCIAL

## 2020-11-27 VITALS — SYSTOLIC BLOOD PRESSURE: 173 MMHG | DIASTOLIC BLOOD PRESSURE: 97 MMHG

## 2020-11-27 VITALS — SYSTOLIC BLOOD PRESSURE: 142 MMHG | DIASTOLIC BLOOD PRESSURE: 88 MMHG

## 2020-11-27 VITALS — WEIGHT: 175 LBS | HEIGHT: 66 IN | BODY MASS INDEX: 28.12 KG/M2

## 2020-11-27 DIAGNOSIS — F41.9: Primary | ICD-10-CM

## 2020-11-27 DIAGNOSIS — Z85.118: ICD-10-CM

## 2020-11-27 DIAGNOSIS — G25.81: ICD-10-CM

## 2020-11-27 DIAGNOSIS — Z90.49: ICD-10-CM

## 2020-11-27 DIAGNOSIS — Z98.51: ICD-10-CM

## 2020-11-27 DIAGNOSIS — G62.9: ICD-10-CM

## 2020-11-27 DIAGNOSIS — Z90.710: ICD-10-CM

## 2020-11-27 DIAGNOSIS — R06.00: ICD-10-CM

## 2020-11-27 DIAGNOSIS — G89.29: ICD-10-CM

## 2020-11-27 DIAGNOSIS — J44.9: ICD-10-CM

## 2020-11-27 DIAGNOSIS — R11.2: ICD-10-CM

## 2020-11-27 DIAGNOSIS — Z96.652: ICD-10-CM

## 2020-11-27 LAB
ABSOLUTE MONOCYTES: 0.6 THOU/UL (ref 0–1.2)
ALBUMIN SERPL-MCNC: 3.7 G/DL (ref 3.4–5)
ALP SERPL-CCNC: 306 U/L (ref 46–116)
ALT SERPL-CCNC: 74 U/L (ref 30–65)
ANION GAP SERPL CALC-SCNC: 6 MMOL/L (ref 7–16)
APTT BLD: 23.9 SECONDS (ref 25–31.3)
AST SERPL-CCNC: 46 U/L (ref 15–37)
BILIRUB SERPL-MCNC: 0.5 MG/DL
BUN SERPL-MCNC: 13 MG/DL (ref 7–18)
CALCIUM SERPL-MCNC: 9.7 MG/DL (ref 8.5–10.1)
CHLORIDE SERPL-SCNC: 97 MMOL/L (ref 98–107)
CO2 SERPL-SCNC: 34 MMOL/L (ref 21–32)
CREAT SERPL-MCNC: 1 MG/DL (ref 0.6–1.3)
GLUCOSE SERPL-MCNC: 140 MG/DL (ref 70–99)
GRANULOCYTES NFR BLD MANUAL: 92 %
HCT VFR BLD CALC: 42.3 % (ref 37–47)
HGB BLD-MCNC: 14.2 GM/DL (ref 12–15)
INR PPP: 1
LYMPHOCYTES # BLD: 0.6 THOU/UL (ref 0.8–5.3)
LYMPHOCYTES NFR BLD AUTO: 4 %
MCH RBC QN AUTO: 32.5 PG (ref 26–34)
MCHC RBC AUTO-ENTMCNC: 33.5 G/DL (ref 28–37)
MCV RBC: 97 FL (ref 80–100)
MONOCYTES NFR BLD: 4 %
MPV: 8.5 FL. (ref 7.2–11.1)
NEUTROPHILS # BLD: 14.4 THOU/UL (ref 1.6–8.1)
NT-PRO BRAIN NAT PEPTIDE: 229 PG/ML (ref ?–300)
NUCLEATED RBCS: 0 /100WBC
PLATELET # BLD EST: ADEQUATE 10*3/UL
PLATELET COUNT*: 281 THOU/UL (ref 150–400)
POTASSIUM SERPL-SCNC: 3.5 MMOL/L (ref 3.5–5.1)
PROT SERPL-MCNC: 7.7 G/DL (ref 6.4–8.2)
PROTHROMBIN TIME: 10.9 SECONDS (ref 9.2–11.5)
RBC # BLD AUTO: 4.36 MIL/UL (ref 4.2–5)
RBC MORPH BLD: NORMAL
RDW-CV: 13.4 % (ref 10.5–14.5)
SODIUM SERPL-SCNC: 137 MMOL/L (ref 136–145)
WBC # BLD AUTO: 15.7 THOU/UL (ref 4–11)

## 2020-11-28 NOTE — EKG
[place] [address]
Phone:  [phone]                     ELECTROCARDIOGRAM REPORT      
_______________________________________________________________________________
 
Name:         SANTOS MCFARLANE                   Room:                     [facility]#:    [id number]     Account #:     [id number]  
Admission:    20    Attend Phys:                     
Discharge:    20    Date of Birth: [birth year]  
Date of Service: 20  Report #:      [id number]
        [id number]
_______________________________________________________________________________
THIS REPORT FOR:  //name//                      
 
                         [facility] ED
                                       
Test Date:    2020               Test Time:    18:11:15
Pat Name:     SANTOS MCFARLANE                Department:   
Patient ID:   [id number]            Room:         [facility]
Gender:       F                        Technician:   MS
:          1965               Requested By: Timothy Taveras
Order Number: [id number] MD:   Juan Flynn
                                 Measurements
Intervals                              Axis          
Rate:         120                      P:            63
[place]:           153                      QRS:          -33
QRSD:         97                       T:            86
QT:           318                                    
QTc:          450                                    
                           Interpretive Statements
Sinus tachycardia
Left axis deviation
RSR' in V1 or V2, right VCD or RVH
Baseline wander in lead(s) II,III,aVL,aVF,V1,V2,V3
Compared to ECG 2020 10:18:26
Sinus rhythm no longer present
Electronically Signed On 2020 12:45:45 CST by Juan Flynn
[email or web address]
 
 
 
 
 
 
 
 
 
 
 
 
 
 
 
 
 
 
  <ELECTRONICALLY SIGNED>
                                           By: Juan Flynn MD, FACC   
  20     1245
D: 20   _____________________________________
T: 20   Juan Flynn MD, FACC     /EPI

## 2020-12-20 ENCOUNTER — HOSPITAL ENCOUNTER (EMERGENCY)
Dept: HOSPITAL 96 - M.ERS | Age: 55
Discharge: HOME | End: 2020-12-20
Payer: COMMERCIAL

## 2020-12-20 VITALS — HEIGHT: 66 IN | BODY MASS INDEX: 20.89 KG/M2 | WEIGHT: 130.01 LBS

## 2020-12-20 VITALS — DIASTOLIC BLOOD PRESSURE: 101 MMHG | SYSTOLIC BLOOD PRESSURE: 168 MMHG

## 2020-12-20 DIAGNOSIS — Z90.49: ICD-10-CM

## 2020-12-20 DIAGNOSIS — Z90.710: ICD-10-CM

## 2020-12-20 DIAGNOSIS — G25.81: ICD-10-CM

## 2020-12-20 DIAGNOSIS — R11.2: Primary | ICD-10-CM

## 2020-12-20 DIAGNOSIS — Z79.899: ICD-10-CM

## 2020-12-20 DIAGNOSIS — Z20.828: ICD-10-CM

## 2020-12-20 DIAGNOSIS — Z98.51: ICD-10-CM

## 2020-12-20 DIAGNOSIS — Z85.118: ICD-10-CM

## 2020-12-20 DIAGNOSIS — J44.9: ICD-10-CM

## 2020-12-20 DIAGNOSIS — G62.9: ICD-10-CM

## 2020-12-20 DIAGNOSIS — Z96.652: ICD-10-CM

## 2020-12-20 LAB
ALBUMIN SERPL-MCNC: 3.4 G/DL (ref 3.4–5)
ALP SERPL-CCNC: 129 U/L (ref 46–116)
ALT SERPL-CCNC: 17 U/L (ref 30–65)
ANION GAP SERPL CALC-SCNC: 10 MMOL/L (ref 7–16)
AST SERPL-CCNC: 14 U/L (ref 15–37)
BACTERIA-REFLEX: (no result) /HPF
BILIRUB SERPL-MCNC: 0.5 MG/DL
BILIRUB UR-MCNC: (no result) MG/DL
BUN SERPL-MCNC: 13 MG/DL (ref 7–18)
CALCIUM SERPL-MCNC: 8.9 MG/DL (ref 8.5–10.1)
CHLORIDE SERPL-SCNC: 100 MMOL/L (ref 98–107)
CO2 SERPL-SCNC: 30 MMOL/L (ref 21–32)
COLOR UR: (no result)
CREAT SERPL-MCNC: 0.8 MG/DL (ref 0.6–1.3)
GLUCOSE SERPL-MCNC: 129 MG/DL (ref 70–99)
GRANULOCYTES NFR BLD MANUAL: 89 %
HCT VFR BLD CALC: 40.2 % (ref 37–47)
HGB BLD-MCNC: 13.6 GM/DL (ref 12–15)
KETONES UR STRIP-MCNC: (no result) MG/DL
LIPASE: 117 U/L (ref 73–393)
LYMPHOCYTES # BLD: 0.6 THOU/UL (ref 0.8–5.3)
LYMPHOCYTES NFR BLD AUTO: 11 %
MCH RBC QN AUTO: 31.7 PG (ref 26–34)
MCHC RBC AUTO-ENTMCNC: 33.9 G/DL (ref 28–37)
MCV RBC: 93.7 FL (ref 80–100)
MPV: 7.9 FL. (ref 7.2–11.1)
NEUTROPHILS # BLD: 5.3 THOU/UL (ref 1.6–8.1)
NUCLEATED RBCS: 0 /100WBC
PLATELET # BLD EST: ADEQUATE 10*3/UL
PLATELET COUNT*: 225 THOU/UL (ref 150–400)
POTASSIUM SERPL-SCNC: 2.9 MMOL/L (ref 3.5–5.1)
PROT SERPL-MCNC: 7.1 G/DL (ref 6.4–8.2)
PROT UR QL STRIP: (no result)
RBC # BLD AUTO: 4.29 MIL/UL (ref 4.2–5)
RBC # UR STRIP: (no result) /UL
RBC MORPH BLD: NORMAL
RDW-CV: 13 % (ref 10.5–14.5)
SODIUM SERPL-SCNC: 140 MMOL/L (ref 136–145)
SP GR UR STRIP: >= 1.03 (ref 1–1.03)
SQUAMOUS: (no result) /LPF (ref 0–3)
THC: POSITIVE
URINE CLARITY: (no result)
URINE GLUCOSE-RANDOM: NEGATIVE
URINE LEUKOCYTES-REFLEX: NEGATIVE
URINE NITRITE-REFLEX: NEGATIVE
URINE WBC-REFLEX: (no result) /HPF (ref 0–5)
UROBILINOGEN UR STRIP-ACNC: 0.2 E.U./DL (ref 0.2–1)
WBC # BLD AUTO: 5.9 THOU/UL (ref 4–11)

## 2021-01-20 ENCOUNTER — HOSPITAL ENCOUNTER (INPATIENT)
Dept: HOSPITAL 96 - M.ERS | Age: 56
LOS: 4 days | Discharge: HOME | DRG: 391 | End: 2021-01-24
Attending: INTERNAL MEDICINE | Admitting: INTERNAL MEDICINE
Payer: COMMERCIAL

## 2021-01-20 VITALS — DIASTOLIC BLOOD PRESSURE: 85 MMHG | SYSTOLIC BLOOD PRESSURE: 134 MMHG

## 2021-01-20 VITALS — WEIGHT: 160.1 LBS | BODY MASS INDEX: 25.73 KG/M2 | HEIGHT: 65.98 IN

## 2021-01-20 VITALS — SYSTOLIC BLOOD PRESSURE: 160 MMHG | DIASTOLIC BLOOD PRESSURE: 94 MMHG

## 2021-01-20 VITALS — DIASTOLIC BLOOD PRESSURE: 102 MMHG | SYSTOLIC BLOOD PRESSURE: 161 MMHG

## 2021-01-20 DIAGNOSIS — F41.9: ICD-10-CM

## 2021-01-20 DIAGNOSIS — Z90.710: ICD-10-CM

## 2021-01-20 DIAGNOSIS — G62.9: ICD-10-CM

## 2021-01-20 DIAGNOSIS — Z87.891: ICD-10-CM

## 2021-01-20 DIAGNOSIS — F12.90: ICD-10-CM

## 2021-01-20 DIAGNOSIS — Z20.822: ICD-10-CM

## 2021-01-20 DIAGNOSIS — R33.9: ICD-10-CM

## 2021-01-20 DIAGNOSIS — Z90.49: ICD-10-CM

## 2021-01-20 DIAGNOSIS — Z85.118: ICD-10-CM

## 2021-01-20 DIAGNOSIS — G89.29: ICD-10-CM

## 2021-01-20 DIAGNOSIS — E86.9: ICD-10-CM

## 2021-01-20 DIAGNOSIS — K44.9: ICD-10-CM

## 2021-01-20 DIAGNOSIS — K31.84: Primary | ICD-10-CM

## 2021-01-20 DIAGNOSIS — K31.1: ICD-10-CM

## 2021-01-20 DIAGNOSIS — G25.81: ICD-10-CM

## 2021-01-20 DIAGNOSIS — G47.00: ICD-10-CM

## 2021-01-20 DIAGNOSIS — Z79.899: ICD-10-CM

## 2021-01-20 DIAGNOSIS — Z96.652: ICD-10-CM

## 2021-01-20 DIAGNOSIS — F41.1: ICD-10-CM

## 2021-01-20 DIAGNOSIS — J44.9: ICD-10-CM

## 2021-01-20 DIAGNOSIS — E83.52: ICD-10-CM

## 2021-01-20 DIAGNOSIS — D75.1: ICD-10-CM

## 2021-01-20 DIAGNOSIS — N17.0: ICD-10-CM

## 2021-01-20 DIAGNOSIS — F32.9: ICD-10-CM

## 2021-01-20 LAB
ABSOLUTE MONOCYTES: 0.2 THOU/UL (ref 0–1.2)
ALBUMIN SERPL-MCNC: 4.2 G/DL (ref 3.4–5)
ALP SERPL-CCNC: 125 U/L (ref 46–116)
ALT SERPL-CCNC: 12 U/L (ref 30–65)
ANION GAP SERPL CALC-SCNC: 11 MMOL/L (ref 7–16)
ANION GAP SERPL CALC-SCNC: 7 MMOL/L (ref 7–16)
ANISOCYTOSIS BLD QL SMEAR: (no result)
AST SERPL-CCNC: 14 U/L (ref 15–37)
BACTERIA-REFLEX: (no result) /HPF
BILIRUB SERPL-MCNC: 0.4 MG/DL
BILIRUB UR-MCNC: (no result) MG/DL
BUN SERPL-MCNC: 15 MG/DL (ref 7–18)
BUN SERPL-MCNC: 32 MG/DL (ref 7–18)
CALCIUM SERPL-MCNC: 10.6 MG/DL (ref 8.5–10.1)
CALCIUM SERPL-MCNC: 9.1 MG/DL (ref 8.5–10.1)
CHLORIDE SERPL-SCNC: 100 MMOL/L (ref 98–107)
CHLORIDE SERPL-SCNC: 98 MMOL/L (ref 98–107)
CO2 SERPL-SCNC: 29 MMOL/L (ref 21–32)
CO2 SERPL-SCNC: 30 MMOL/L (ref 21–32)
COLOR UR: (no result)
CREAT SERPL-MCNC: 1.2 MG/DL (ref 0.6–1.3)
CREAT SERPL-MCNC: 1.9 MG/DL (ref 0.6–1.3)
GLUCOSE SERPL-MCNC: 109 MG/DL (ref 70–99)
GLUCOSE SERPL-MCNC: 209 MG/DL (ref 70–99)
GRANULOCYTES NFR BLD MANUAL: 89 %
HCT VFR BLD CALC: 46.7 % (ref 37–47)
HGB BLD-MCNC: 15.8 GM/DL (ref 12–15)
HYALINE CASTS #/AREA URNS LPF: (no result) /LPF
KETONES UR STRIP-MCNC: (no result) MG/DL
LIPASE: 69 U/L (ref 73–393)
LYMPHOCYTES # BLD: 0.9 THOU/UL (ref 0.8–5.3)
LYMPHOCYTES NFR BLD AUTO: 9 %
MAGNESIUM SERPL-MCNC: 2.4 MG/DL (ref 1.8–2.4)
MCH RBC QN AUTO: 31.4 PG (ref 26–34)
MCHC RBC AUTO-ENTMCNC: 33.8 G/DL (ref 28–37)
MCV RBC: 93 FL (ref 80–100)
MONOCYTES NFR BLD: 2 %
MPV: 7.9 FL. (ref 7.2–11.1)
MUCUS: (no result) STRN/LPF
NEUTROPHILS # BLD: 8.6 THOU/UL (ref 1.6–8.1)
NUCLEATED RBCS: 0 /100WBC
PHOSPHATE SERPL-MCNC: 3.9 MG/DL (ref 2.5–4.9)
PLATELET # BLD EST: ADEQUATE 10*3/UL
PLATELET COUNT*: 286 THOU/UL (ref 150–400)
POIKILOCYTOSIS BLD QL SMEAR: (no result)
POTASSIUM SERPL-SCNC: 3.7 MMOL/L (ref 3.5–5.1)
POTASSIUM SERPL-SCNC: 4.3 MMOL/L (ref 3.5–5.1)
PROT SERPL-MCNC: 8.5 G/DL (ref 6.4–8.2)
PROT UR QL STRIP: (no result)
RBC # BLD AUTO: 5.02 MIL/UL (ref 4.2–5)
RBC # UR STRIP: (no result) /UL
RBC #/AREA URNS HPF: (no result) /HPF (ref 0–2)
RDW-CV: 14 % (ref 10.5–14.5)
SODIUM SERPL-SCNC: 136 MMOL/L (ref 136–145)
SODIUM SERPL-SCNC: 139 MMOL/L (ref 136–145)
SP GR UR STRIP: 1.02 (ref 1–1.03)
SQUAMOUS: (no result) /LPF (ref 0–3)
URINE CLARITY: CLEAR
URINE GLUCOSE-RANDOM: NEGATIVE
URINE LEUKOCYTES-REFLEX: NEGATIVE
URINE NITRITE-REFLEX: NEGATIVE
URINE WBC-REFLEX: (no result) /HPF (ref 0–5)
UROBILINOGEN UR STRIP-ACNC: 1 E.U./DL (ref 0.2–1)
WBC # BLD AUTO: 9.7 THOU/UL (ref 4–11)

## 2021-01-20 NOTE — PROC
[address]  [address]                    PROCEDURE REPORT              
_______________________________________________________________________________
 
Name:       SANTOS MCFARLANE                    Room:           [address] [place]  
M.R.#:  [id number]      Account #:      [id number]  
Admission:  01/20/21     Attend Phys:    Maksim Castle MD 
Discharge:  01/24/21     Date of Birth:  [birth year]  
         Report #: [id number]
                                                                                
_______________________________________________________________________________
THIS REPORT FOR:  
 
cc:  Liane Durand Anna S. DO                                                    ~
     [facility],Medical Records Staff    
For GI report, please see the Provation report in Perceptive 7 content.
 
 
 
 
 
 
 
 
 
 
 
 
 
 
 
 
 
 
 
 
 
 
 
 
 
 
 
 
 
 
 
 
 
 
 
 
 
 
                       
                                        By:                                
                 
D: 01/23/21     _______________________________________
T: 01/27/21 1449Medical Records Staff [facility]       /AL

## 2021-01-20 NOTE — EKG
[place] [address]
Phone:  [phone]                     ELECTROCARDIOGRAM REPORT      
_______________________________________________________________________________
 
Name:         SANTOS MCFARLANE                   Room:          [place] [address]    ADM [place] 
[facility]#:    [id number]     Account #:     [id number]  
Admission:    21    Attend Phys:   Maksim Castle, 
Discharge:                Date of Birth: [birth year]  
Date of Service: 21 1047  Report #:      [id number]
        [id number]
_______________________________________________________________________________
THIS REPORT FOR:  //name//                      
 
                         [facility] ED
                                       
Test Date:    2021               Test Time:    10:47:24
Pat Name:     SANTOS MCFARLANE                Department:   
Patient ID:   [id number]            Room:         [facility]
Gender:       F                        Technician:   
:          1965               Requested By: Rogelio Fields
Order Number: [id number] MD:   South Shi
                                 Measurements
Intervals                              Axis          
Rate:         133                      P:            -20
TN:           127                      QRS:          -90
QRSD:         162                      T:            69
QT:           388                                    
QTc:          578                                    
                           Interpretive Statements
Sinus tachycardia
early transition
 
Nonspecific IVCD with LAD
Inferior infarct, old
Lateral infarct, old
Baseline wander in lead(s) I,II,III,aVL,aVF,V1,V2,V5,V6
Compared to ECG 2020 18:11:15
Intraventricular conduction delay now present
Myocardial infarct finding now present
Electronically Signed On 2021 15:53:35 CST by South Shi
[email or web address]
 
 
 
 
 
 
 
 
 
 
 
 
 
 
  <ELECTRONICALLY SIGNED>
                                           By: South Shi MD, [facility]      
  21     1553
D: 21 1047   _____________________________________
T: 21 1047   South Shi MD, [facility]        /EPI

## 2021-01-21 VITALS — DIASTOLIC BLOOD PRESSURE: 89 MMHG | SYSTOLIC BLOOD PRESSURE: 146 MMHG

## 2021-01-21 VITALS — DIASTOLIC BLOOD PRESSURE: 100 MMHG | SYSTOLIC BLOOD PRESSURE: 151 MMHG

## 2021-01-21 VITALS — SYSTOLIC BLOOD PRESSURE: 152 MMHG | DIASTOLIC BLOOD PRESSURE: 96 MMHG

## 2021-01-21 VITALS — DIASTOLIC BLOOD PRESSURE: 102 MMHG | SYSTOLIC BLOOD PRESSURE: 146 MMHG

## 2021-01-21 VITALS — SYSTOLIC BLOOD PRESSURE: 154 MMHG | DIASTOLIC BLOOD PRESSURE: 92 MMHG

## 2021-01-21 VITALS — SYSTOLIC BLOOD PRESSURE: 165 MMHG | DIASTOLIC BLOOD PRESSURE: 88 MMHG

## 2021-01-21 LAB
ALBUMIN SERPL-MCNC: 3.4 G/DL (ref 3.4–5)
ALP SERPL-CCNC: 95 U/L (ref 46–116)
ALT SERPL-CCNC: 11 U/L (ref 30–65)
ANION GAP SERPL CALC-SCNC: 10 MMOL/L (ref 7–16)
AST SERPL-CCNC: 10 U/L (ref 15–37)
BILIRUB SERPL-MCNC: 0.4 MG/DL
BUN SERPL-MCNC: 8 MG/DL (ref 7–18)
CALCIUM SERPL-MCNC: 9.3 MG/DL (ref 8.5–10.1)
CHLORIDE SERPL-SCNC: 101 MMOL/L (ref 98–107)
CO2 SERPL-SCNC: 27 MMOL/L (ref 21–32)
CREAT SERPL-MCNC: 0.8 MG/DL (ref 0.6–1.3)
GLUCOSE SERPL-MCNC: 116 MG/DL (ref 70–99)
HCT VFR BLD CALC: 39.4 % (ref 37–47)
HGB BLD-MCNC: 13.3 GM/DL (ref 12–15)
MAGNESIUM SERPL-MCNC: 1.8 MG/DL (ref 1.8–2.4)
MCH RBC QN AUTO: 31.4 PG (ref 26–34)
MCHC RBC AUTO-ENTMCNC: 33.8 G/DL (ref 28–37)
MCV RBC: 93.1 FL (ref 80–100)
MPV: 8 FL. (ref 7.2–11.1)
PLATELET COUNT*: 222 THOU/UL (ref 150–400)
POTASSIUM SERPL-SCNC: 3.6 MMOL/L (ref 3.5–5.1)
PROT SERPL-MCNC: 6.8 G/DL (ref 6.4–8.2)
RBC # BLD AUTO: 4.23 MIL/UL (ref 4.2–5)
RDW-CV: 13.8 % (ref 10.5–14.5)
SODIUM SERPL-SCNC: 138 MMOL/L (ref 136–145)
THC: POSITIVE
WBC # BLD AUTO: 10.1 THOU/UL (ref 4–11)

## 2021-01-21 NOTE — NUR
PT ADMITTED TO  @ ABOUT 2000. ALERT AND ORIENTED. VSS ON RA. PT ORIENTED
TO RM AND CALL LIGHT. PT VERY ANXIOUS. VOICED BEING BIPOLAR. RLS. HOME MED
RECONCILED. PT NPO. PT WANTED HER PO SEROQUEL. DR STEPHEN VOICED NOT BEING
ABLE TO ORDER SEROQUEL AS PT HAS GASTRIC OBSTRUCTION. IV ATIVAN AND BENADRYL
GIVEN THIS SHIFT. PRN IV PAIN MED ALSO GIVEN THIS SHIFT FOR LOWER BACK PAIN.
PT HAS NEUROSTIMULATOR FOR HER BACK PAIN WHICH SHE VOICED DOES NOT WORK. PT
CALLED OUT ALMOST CONSTANTLY TO HAVE A BM BUT NEVER HAD ANY. FISHER IN PLACE
FOR VOIDING. FALL PRECAUTION IN PLACE. CALL LIGHT WITHIN REACH. HOURLY
ROUNDINGS MADE. GI CONSULT. WILL CONTINUE TO MONITOR.

## 2021-01-21 NOTE — NUR
Pt is A&O. Resides at home with her .  assists with ADLs and
completes IADLs. Pt states that she primarily uses a wc for mobility, has a
walker, but states that when she stands her BP drops and she passes out. No
home o2. No hx of HH or SNF. Goal is home at dc. Pt to have gastric emptying
test today. Anticipate dc in a few days. No needs anticipated, Pt declined HH.

## 2021-01-22 VITALS — SYSTOLIC BLOOD PRESSURE: 166 MMHG | DIASTOLIC BLOOD PRESSURE: 102 MMHG

## 2021-01-22 VITALS — DIASTOLIC BLOOD PRESSURE: 97 MMHG | SYSTOLIC BLOOD PRESSURE: 160 MMHG

## 2021-01-22 VITALS — SYSTOLIC BLOOD PRESSURE: 165 MMHG | DIASTOLIC BLOOD PRESSURE: 97 MMHG

## 2021-01-22 VITALS — DIASTOLIC BLOOD PRESSURE: 82 MMHG | SYSTOLIC BLOOD PRESSURE: 131 MMHG

## 2021-01-22 VITALS — DIASTOLIC BLOOD PRESSURE: 98 MMHG | SYSTOLIC BLOOD PRESSURE: 169 MMHG

## 2021-01-22 LAB
ABSOLUTE BASOPHILS: 0 THOU/UL (ref 0–0.2)
ABSOLUTE EOSINOPHILS: 0 THOU/UL (ref 0–0.7)
ABSOLUTE MONOCYTES: 0.4 THOU/UL (ref 0–1.2)
ALBUMIN SERPL-MCNC: 3.4 G/DL (ref 3.4–5)
ALP SERPL-CCNC: 95 U/L (ref 46–116)
ALT SERPL-CCNC: 14 U/L (ref 30–65)
ANION GAP SERPL CALC-SCNC: 8 MMOL/L (ref 7–16)
AST SERPL-CCNC: 16 U/L (ref 15–37)
BASOPHILS NFR BLD AUTO: 0.4 %
BILIRUB SERPL-MCNC: 0.5 MG/DL
BUN SERPL-MCNC: 7 MG/DL (ref 7–18)
CALCIUM SERPL-MCNC: 9.2 MG/DL (ref 8.5–10.1)
CHLORIDE SERPL-SCNC: 101 MMOL/L (ref 98–107)
CO2 SERPL-SCNC: 28 MMOL/L (ref 21–32)
CREAT SERPL-MCNC: 0.8 MG/DL (ref 0.6–1.3)
EOSINOPHIL NFR BLD: 0.1 %
GLUCOSE SERPL-MCNC: 96 MG/DL (ref 70–99)
GRANULOCYTES NFR BLD MANUAL: 76.6 %
HCT VFR BLD CALC: 40.4 % (ref 37–47)
HGB BLD-MCNC: 13.7 GM/DL (ref 12–15)
LYMPHOCYTES # BLD: 0.9 THOU/UL (ref 0.8–5.3)
LYMPHOCYTES NFR BLD AUTO: 15.8 %
MCH RBC QN AUTO: 31.4 PG (ref 26–34)
MCHC RBC AUTO-ENTMCNC: 33.9 G/DL (ref 28–37)
MCV RBC: 92.6 FL (ref 80–100)
MONOCYTES NFR BLD: 7.1 %
MPV: 7.9 FL. (ref 7.2–11.1)
NEUTROPHILS # BLD: 4.1 THOU/UL (ref 1.6–8.1)
NUCLEATED RBCS: 0 /100WBC
PLATELET COUNT*: 215 THOU/UL (ref 150–400)
POTASSIUM SERPL-SCNC: 3.8 MMOL/L (ref 3.5–5.1)
PROT SERPL-MCNC: 6.8 G/DL (ref 6.4–8.2)
RBC # BLD AUTO: 4.36 MIL/UL (ref 4.2–5)
RDW-CV: 14.1 % (ref 10.5–14.5)
SODIUM SERPL-SCNC: 137 MMOL/L (ref 136–145)
WBC # BLD AUTO: 5.4 THOU/UL (ref 4–11)

## 2021-01-22 NOTE — NUR
ASSUMED PT CARE AT 1915. NURSING ASSESSMENT COMPLETED AT START OF SHIFT. PT
ANXIOUS AND RESTLESS THIS SHIFT. C/O ABDOMINAL AND BACK PAIN THROUGHOUT SHIFT.
C/O NAUSEA. PRN MEDS ADMINISTERED. SEE EMAR FOR DOCUMENTATION. NPO AFTER
MIDNIGHT FOR GASTRIC EMPTYING TEST. SR ON CARDIAC MONITOR. HIGH FALL
PRECAUTIONS IN PLACE. CALL LIGHT WITHIN REACH.

## 2021-01-23 VITALS — DIASTOLIC BLOOD PRESSURE: 52 MMHG | SYSTOLIC BLOOD PRESSURE: 132 MMHG

## 2021-01-23 VITALS — DIASTOLIC BLOOD PRESSURE: 60 MMHG | SYSTOLIC BLOOD PRESSURE: 100 MMHG

## 2021-01-23 VITALS — DIASTOLIC BLOOD PRESSURE: 74 MMHG | SYSTOLIC BLOOD PRESSURE: 114 MMHG

## 2021-01-23 VITALS — SYSTOLIC BLOOD PRESSURE: 147 MMHG | DIASTOLIC BLOOD PRESSURE: 87 MMHG

## 2021-01-23 LAB
ABSOLUTE BASOPHILS: 0 THOU/UL (ref 0–0.2)
ABSOLUTE EOSINOPHILS: 0.1 THOU/UL (ref 0–0.7)
ABSOLUTE MONOCYTES: 0.4 THOU/UL (ref 0–1.2)
ANION GAP SERPL CALC-SCNC: 7 MMOL/L (ref 7–16)
BASOPHILS NFR BLD AUTO: 1.1 %
BUN SERPL-MCNC: 15 MG/DL (ref 7–18)
CALCIUM SERPL-MCNC: 8.6 MG/DL (ref 8.5–10.1)
CHLORIDE SERPL-SCNC: 101 MMOL/L (ref 98–107)
CO2 SERPL-SCNC: 28 MMOL/L (ref 21–32)
CREAT SERPL-MCNC: 0.9 MG/DL (ref 0.6–1.3)
EOSINOPHIL NFR BLD: 1.5 %
GLUCOSE SERPL-MCNC: 75 MG/DL (ref 70–99)
GRANULOCYTES NFR BLD MANUAL: 51.1 %
HCT VFR BLD CALC: 36.4 % (ref 37–47)
HGB BLD-MCNC: 12.2 GM/DL (ref 12–15)
LYMPHOCYTES # BLD: 1.7 THOU/UL (ref 0.8–5.3)
LYMPHOCYTES NFR BLD AUTO: 37.2 %
MCH RBC QN AUTO: 30.9 PG (ref 26–34)
MCHC RBC AUTO-ENTMCNC: 33.6 G/DL (ref 28–37)
MCV RBC: 92.1 FL (ref 80–100)
MONOCYTES NFR BLD: 9.1 %
MPV: 8.1 FL. (ref 7.2–11.1)
NEUTROPHILS # BLD: 2.4 THOU/UL (ref 1.6–8.1)
NUCLEATED RBCS: 0 /100WBC
PLATELET COUNT*: 190 THOU/UL (ref 150–400)
POTASSIUM SERPL-SCNC: 3.3 MMOL/L (ref 3.5–5.1)
RBC # BLD AUTO: 3.96 MIL/UL (ref 4.2–5)
RDW-CV: 14.1 % (ref 10.5–14.5)
SODIUM SERPL-SCNC: 136 MMOL/L (ref 136–145)
WBC # BLD AUTO: 4.7 THOU/UL (ref 4–11)

## 2021-01-23 PROCEDURE — 0DJ08ZZ INSPECTION OF UPPER INTESTINAL TRACT, VIA NATURAL OR ARTIFICIAL OPENING ENDOSCOPIC: ICD-10-PCS | Performed by: INTERNAL MEDICINE

## 2021-01-23 NOTE — NUR
ASSUMED CARE OF PATIENT THIS AM AT 0730.  PATIENT IS ALERT AND ORIENTED X 4.
SHE C/O ABD PAIN AND BACK PAIN THIS AM.  PATIENT KEPT NPO FOR EGD THIS AM.
CONSENT SIGNED AND PATIENT TAKEN TO PACU FOR PROCEDURE PER W/C.  PATIENT
RETURNED TO THE ROOM PER W/C IN THE AFTERNOON.  PATIENT WAS MEDICATED FOR PAIN
AGAIN THIS AFTERNOON AND EVENING.  SHE NOW DENIES NAUSEA.  FISHER CATH
DISCONTINUED THIS AM PER ORDER.  PATIENT ASSISTED TO THE BSC THROUGHOUT THE
DAY.  NO FALLS OR INJURY. SHE IS TOLERATING HER MEALS WITHOUT DIFFICULTY.

## 2021-01-23 NOTE — NUR
Pt still complains of intermittent abdominal pain rated 8-9. Pt given fentanyl
25mcg x3 this shift with partial relief. Pt also  had 1 episode of nausea,
given x1 zofran with relief. Pt gomes cath intact. Pt call light within reach,
safety precaution in placed.

## 2021-01-24 VITALS — SYSTOLIC BLOOD PRESSURE: 137 MMHG | DIASTOLIC BLOOD PRESSURE: 84 MMHG

## 2021-01-24 LAB
ANION GAP SERPL CALC-SCNC: 7 MMOL/L (ref 7–16)
BUN SERPL-MCNC: 16 MG/DL (ref 7–18)
CALCIUM SERPL-MCNC: 8.9 MG/DL (ref 8.5–10.1)
CHLORIDE SERPL-SCNC: 102 MMOL/L (ref 98–107)
CO2 SERPL-SCNC: 29 MMOL/L (ref 21–32)
CREAT SERPL-MCNC: 0.9 MG/DL (ref 0.6–1.3)
GLUCOSE SERPL-MCNC: 71 MG/DL (ref 70–99)
MAGNESIUM SERPL-MCNC: 2.1 MG/DL (ref 1.8–2.4)
POTASSIUM SERPL-SCNC: 3.2 MMOL/L (ref 3.5–5.1)
SODIUM SERPL-SCNC: 138 MMOL/L (ref 136–145)

## 2021-01-24 NOTE — NUR
ASSUMED PT'S CARE @ ABOUT 1900. ALERT AND ORIENTED. VSS ON RA. MEDS GIVEN PER
EMAR. PT SLEPT WELL THIS SHIFT. PAIN MED GIVEN PER EMAR. FALL PRECAUTION IN
PLACE. CALL LIGHT WITHIN REACH. HOURLY ROUNDINGS MADE. WILL CONTINUE TO
MONITOR.

## 2021-01-24 NOTE — NUR
ASSUMED CARE OF PATIENT THIS AM AT 0730.  PATIENT IS ALERT AND ORIENTED X 4.
SHE C/O CONTINUED ABD AND BACK PAIN.  PATIENT MEDICATED WITH PO TYLENOL.  DR
IN TO ROUND AND DISCHARGE ORDERS WERE WRITTEN.  PATIENT IS TO DISCHARGE TODAY.
 POTASSIUM LEVEL WAS LOW.  K+ REPLACEMENT GIVEN.  PATIENT EDUCATED ON
DISCHARGE PROCEDURE.  ASSISTED WITH ADLS THROUGHOUT THE DAY.  NO FALLS OR
INJURY.

## 2021-01-27 ENCOUNTER — HOSPITAL ENCOUNTER (EMERGENCY)
Dept: HOSPITAL 96 - M.ERS | Age: 56
Discharge: HOME | End: 2021-01-27
Payer: COMMERCIAL

## 2021-01-27 VITALS — BODY MASS INDEX: 25.72 KG/M2 | HEIGHT: 66 IN | WEIGHT: 160.01 LBS

## 2021-01-27 VITALS — SYSTOLIC BLOOD PRESSURE: 159 MMHG | DIASTOLIC BLOOD PRESSURE: 99 MMHG

## 2021-01-27 VITALS — DIASTOLIC BLOOD PRESSURE: 99 MMHG | SYSTOLIC BLOOD PRESSURE: 172 MMHG

## 2021-01-27 VITALS — HEIGHT: 66 IN | BODY MASS INDEX: 26.2 KG/M2 | WEIGHT: 163.01 LBS

## 2021-01-27 DIAGNOSIS — Z90.710: ICD-10-CM

## 2021-01-27 DIAGNOSIS — R11.2: Primary | ICD-10-CM

## 2021-01-27 DIAGNOSIS — J44.9: ICD-10-CM

## 2021-01-27 DIAGNOSIS — K31.84: ICD-10-CM

## 2021-01-27 DIAGNOSIS — Z79.899: ICD-10-CM

## 2021-01-27 DIAGNOSIS — Z90.49: ICD-10-CM

## 2021-01-27 DIAGNOSIS — R11.15: Primary | ICD-10-CM

## 2021-01-27 DIAGNOSIS — R10.84: ICD-10-CM

## 2021-01-27 DIAGNOSIS — G89.29: ICD-10-CM

## 2021-01-27 LAB
ABSOLUTE BASOPHILS: 0 THOU/UL (ref 0–0.2)
ABSOLUTE EOSINOPHILS: 0 THOU/UL (ref 0–0.7)
ABSOLUTE MONOCYTES: 0.4 THOU/UL (ref 0–1.2)
ALBUMIN SERPL-MCNC: 3.6 G/DL (ref 3.4–5)
ALBUMIN SERPL-MCNC: 3.7 G/DL (ref 3.4–5)
ALP SERPL-CCNC: 102 U/L (ref 46–116)
ALP SERPL-CCNC: 98 U/L (ref 46–116)
ALT SERPL-CCNC: 12 U/L (ref 30–65)
ALT SERPL-CCNC: 12 U/L (ref 30–65)
ANION GAP SERPL CALC-SCNC: 4 MMOL/L (ref 7–16)
ANION GAP SERPL CALC-SCNC: 8 MMOL/L (ref 7–16)
AST SERPL-CCNC: 10 U/L (ref 15–37)
AST SERPL-CCNC: 10 U/L (ref 15–37)
BACTERIA-REFLEX: (no result) /HPF
BASOPHILS NFR BLD AUTO: 0.5 %
BILIRUB SERPL-MCNC: 0.3 MG/DL
BILIRUB SERPL-MCNC: 0.3 MG/DL
BILIRUB UR-MCNC: NEGATIVE MG/DL
BUN SERPL-MCNC: 7 MG/DL (ref 7–18)
BUN SERPL-MCNC: 8 MG/DL (ref 7–18)
CALCIUM SERPL-MCNC: 9.3 MG/DL (ref 8.5–10.1)
CALCIUM SERPL-MCNC: 9.4 MG/DL (ref 8.5–10.1)
CHLORIDE SERPL-SCNC: 100 MMOL/L (ref 98–107)
CHLORIDE SERPL-SCNC: 100 MMOL/L (ref 98–107)
CO2 SERPL-SCNC: 27 MMOL/L (ref 21–32)
CO2 SERPL-SCNC: 32 MMOL/L (ref 21–32)
COLOR UR: YELLOW
CREAT SERPL-MCNC: 1 MG/DL (ref 0.6–1.3)
CREAT SERPL-MCNC: 1 MG/DL (ref 0.6–1.3)
EOSINOPHIL NFR BLD: 0.1 %
GLUCOSE SERPL-MCNC: 132 MG/DL (ref 70–99)
GLUCOSE SERPL-MCNC: 171 MG/DL (ref 70–99)
GRANULOCYTES NFR BLD MANUAL: 83.4 %
HCT VFR BLD CALC: 42.5 % (ref 37–47)
HCT VFR BLD CALC: 45.9 % (ref 37–47)
HGB BLD-MCNC: 14.2 GM/DL (ref 12–15)
HGB BLD-MCNC: 15.1 GM/DL (ref 12–15)
KETONES UR STRIP-MCNC: NEGATIVE MG/DL
LIPASE: 67 U/L (ref 73–393)
LIPASE: 68 U/L (ref 73–393)
LYMPHOCYTES # BLD: 0.8 THOU/UL (ref 0.8–5.3)
LYMPHOCYTES NFR BLD AUTO: 10.6 %
MCH RBC QN AUTO: 30.6 PG (ref 26–34)
MCH RBC QN AUTO: 31 PG (ref 26–34)
MCHC RBC AUTO-ENTMCNC: 32.9 G/DL (ref 28–37)
MCHC RBC AUTO-ENTMCNC: 33.5 G/DL (ref 28–37)
MCV RBC: 92.7 FL (ref 80–100)
MCV RBC: 93.2 FL (ref 80–100)
MONOCYTES NFR BLD: 5.4 %
MPV: 8 FL. (ref 7.2–11.1)
MPV: 8.2 FL. (ref 7.2–11.1)
MUCUS: (no result) STRN/LPF
NEUTROPHILS # BLD: 6.4 THOU/UL (ref 1.6–8.1)
NUCLEATED RBCS: 0 /100WBC
PLATELET COUNT*: 257 THOU/UL (ref 150–400)
PLATELET COUNT*: 293 THOU/UL (ref 150–400)
POTASSIUM SERPL-SCNC: 3.8 MMOL/L (ref 3.5–5.1)
POTASSIUM SERPL-SCNC: 3.9 MMOL/L (ref 3.5–5.1)
PROT SERPL-MCNC: 7.1 G/DL (ref 6.4–8.2)
PROT SERPL-MCNC: 7.1 G/DL (ref 6.4–8.2)
PROT UR QL STRIP: NEGATIVE
RBC # BLD AUTO: 4.58 MIL/UL (ref 4.2–5)
RBC # BLD AUTO: 4.92 MIL/UL (ref 4.2–5)
RBC # UR STRIP: (no result) /UL
RBC #/AREA URNS HPF: (no result) /HPF (ref 0–2)
RDW-CV: 14.2 % (ref 10.5–14.5)
RDW-CV: 14.6 % (ref 10.5–14.5)
SODIUM SERPL-SCNC: 135 MMOL/L (ref 136–145)
SODIUM SERPL-SCNC: 136 MMOL/L (ref 136–145)
SP GR UR STRIP: 1.02 (ref 1–1.03)
SQUAMOUS: (no result) /LPF (ref 0–3)
THC: POSITIVE
URINE CLARITY: CLEAR
URINE GLUCOSE-RANDOM: NEGATIVE
URINE LEUKOCYTES-REFLEX: NEGATIVE
URINE NITRITE-REFLEX: NEGATIVE
URINE WBC-REFLEX: (no result) /HPF (ref 0–5)
UROBILINOGEN UR STRIP-ACNC: 0.2 E.U./DL (ref 0.2–1)
WBC # BLD AUTO: 6.5 THOU/UL (ref 4–11)
WBC # BLD AUTO: 7.7 THOU/UL (ref 4–11)

## 2021-01-28 ENCOUNTER — HOSPITAL ENCOUNTER (EMERGENCY)
Dept: HOSPITAL 96 - M.ERS | Age: 56
Discharge: HOME | End: 2021-01-28
Payer: COMMERCIAL

## 2021-01-28 VITALS — SYSTOLIC BLOOD PRESSURE: 178 MMHG | DIASTOLIC BLOOD PRESSURE: 144 MMHG

## 2021-01-28 VITALS — HEIGHT: 66 IN | WEIGHT: 163.01 LBS | BODY MASS INDEX: 26.2 KG/M2

## 2021-01-28 DIAGNOSIS — G25.81: ICD-10-CM

## 2021-01-28 DIAGNOSIS — R11.2: Primary | ICD-10-CM

## 2021-01-28 DIAGNOSIS — K31.84: ICD-10-CM

## 2021-01-28 DIAGNOSIS — Z90.710: ICD-10-CM

## 2021-01-28 DIAGNOSIS — Z96.652: ICD-10-CM

## 2021-01-28 DIAGNOSIS — Z85.118: ICD-10-CM

## 2021-01-28 DIAGNOSIS — J44.9: ICD-10-CM

## 2021-01-28 DIAGNOSIS — G62.9: ICD-10-CM

## 2021-01-28 DIAGNOSIS — Z90.49: ICD-10-CM

## 2021-01-28 NOTE — EKG
[place] [address]
Phone:  [phone]                     ELECTROCARDIOGRAM REPORT      
_______________________________________________________________________________
 
Name:         SANTOS MCFARLANE MITZI                   Room:                     [facility]#:    [id number]     Account #:     [id number]  
Admission:    21    Attend Phys:                     
Discharge:    21    Date of Birth: [birth year]  
Date of Service: 21 1436  Report #:      [id number]
        [id number]
_______________________________________________________________________________
THIS REPORT FOR:  //name//                      
 
                         [facility] ED
                                       
Test Date:    2021               Test Time:    14:36:15
Pat Name:     SANTOS MCFARLANE                Department:   
Patient ID:   [id number]            Room:          
Gender:                               Technician:   CELIO
:          1965               Requested By: Jessica Hurst
Order Number: [id number] MD:   Juan Flynn
                                 Measurements
Intervals                              Axis          
Rate:         103                      P:            
ME:                                    QRS:          -28
QRSD:         91                       T:            47
QT:           369                                    
QTc:          483                                    
                           Interpretive Statements
Sinus tachycardia 
Left atrial enlargement, possible
Borderline left axis deviation
Abnormal R-wave progression, early transition
Artifact in lead(s) I,II,III,aVR,aVL,aVF
Compared to ECG 2021 10:47:24
Intraventricular conduction delay no longer present
Myocardial infarct finding no longer present
Electronically Signed On 2021 15:57:19 CST by Juan Flynn
[email or web address]
 
 
 
 
 
 
 
 
 
 
 
 
 
 
 
 
  <ELECTRONICALLY SIGNED>
                                           By: Juan Flynn MD, FAC   
  21     1557
D: 21 1436   _____________________________________
T: 21 1436   Juan Flynn MD, [facility]     /EPI

## 2021-01-30 ENCOUNTER — HOSPITAL ENCOUNTER (EMERGENCY)
Dept: HOSPITAL 96 - M.ERS | Age: 56
Discharge: HOME | End: 2021-01-30
Payer: COMMERCIAL

## 2021-01-30 VITALS — BODY MASS INDEX: 25.07 KG/M2 | WEIGHT: 156 LBS | HEIGHT: 66 IN

## 2021-01-30 VITALS — SYSTOLIC BLOOD PRESSURE: 155 MMHG | DIASTOLIC BLOOD PRESSURE: 89 MMHG

## 2021-01-30 DIAGNOSIS — K31.84: ICD-10-CM

## 2021-01-30 DIAGNOSIS — G62.9: ICD-10-CM

## 2021-01-30 DIAGNOSIS — Z79.899: ICD-10-CM

## 2021-01-30 DIAGNOSIS — Z85.118: ICD-10-CM

## 2021-01-30 DIAGNOSIS — R10.32: ICD-10-CM

## 2021-01-30 DIAGNOSIS — R94.5: ICD-10-CM

## 2021-01-30 DIAGNOSIS — R11.2: ICD-10-CM

## 2021-01-30 DIAGNOSIS — G25.81: ICD-10-CM

## 2021-01-30 DIAGNOSIS — Z98.51: ICD-10-CM

## 2021-01-30 DIAGNOSIS — Z96.652: ICD-10-CM

## 2021-01-30 DIAGNOSIS — Z90.49: ICD-10-CM

## 2021-01-30 DIAGNOSIS — J44.9: ICD-10-CM

## 2021-01-30 DIAGNOSIS — G89.29: ICD-10-CM

## 2021-01-30 DIAGNOSIS — R10.31: Primary | ICD-10-CM

## 2021-01-30 DIAGNOSIS — Z90.710: ICD-10-CM

## 2021-01-30 LAB
ABSOLUTE BASOPHILS: 0 THOU/UL (ref 0–0.2)
ABSOLUTE EOSINOPHILS: 0.1 THOU/UL (ref 0–0.7)
ABSOLUTE MONOCYTES: 0.3 THOU/UL (ref 0–1.2)
ALBUMIN SERPL-MCNC: 3.5 G/DL (ref 3.4–5)
ALP SERPL-CCNC: 318 U/L (ref 46–116)
ALT SERPL-CCNC: 131 U/L (ref 30–65)
ANION GAP SERPL CALC-SCNC: 10 MMOL/L (ref 7–16)
AST SERPL-CCNC: 165 U/L (ref 15–37)
BASOPHILS NFR BLD AUTO: 0.8 %
BILIRUB SERPL-MCNC: 1 MG/DL
BILIRUB UR-MCNC: (no result) MG/DL
BUN SERPL-MCNC: 11 MG/DL (ref 7–18)
CALCIUM SERPL-MCNC: 9.2 MG/DL (ref 8.5–10.1)
CHLORIDE SERPL-SCNC: 101 MMOL/L (ref 98–107)
CO2 SERPL-SCNC: 26 MMOL/L (ref 21–32)
COLOR UR: YELLOW
CREAT SERPL-MCNC: 1.1 MG/DL (ref 0.6–1.3)
EOSINOPHIL NFR BLD: 1.5 %
GLUCOSE SERPL-MCNC: 121 MG/DL (ref 70–99)
GRANULOCYTES NFR BLD MANUAL: 77.6 %
HCT VFR BLD CALC: 42.8 % (ref 37–47)
HGB BLD-MCNC: 14.3 GM/DL (ref 12–15)
KETONES UR STRIP-MCNC: NEGATIVE MG/DL
LYMPHOCYTES # BLD: 0.8 THOU/UL (ref 0.8–5.3)
LYMPHOCYTES NFR BLD AUTO: 14.2 %
MCH RBC QN AUTO: 31.1 PG (ref 26–34)
MCHC RBC AUTO-ENTMCNC: 33.5 G/DL (ref 28–37)
MCV RBC: 92.8 FL (ref 80–100)
MONOCYTES NFR BLD: 5.9 %
MPV: 7.4 FL. (ref 7.2–11.1)
NEUTROPHILS # BLD: 4.5 THOU/UL (ref 1.6–8.1)
NUCLEATED RBCS: 0 /100WBC
PLATELET COUNT*: 206 THOU/UL (ref 150–400)
POTASSIUM SERPL-SCNC: 3.5 MMOL/L (ref 3.5–5.1)
PROT SERPL-MCNC: 6.9 G/DL (ref 6.4–8.2)
PROT UR QL STRIP: NEGATIVE
RBC # BLD AUTO: 4.61 MIL/UL (ref 4.2–5)
RBC # UR STRIP: NEGATIVE /UL
RDW-CV: 14.2 % (ref 10.5–14.5)
SODIUM SERPL-SCNC: 137 MMOL/L (ref 136–145)
SP GR UR STRIP: 1.02 (ref 1–1.03)
THC: POSITIVE
URINE CLARITY: CLEAR
URINE GLUCOSE-RANDOM: NEGATIVE
URINE LEUKOCYTES-REFLEX: NEGATIVE
URINE NITRITE-REFLEX: NEGATIVE
UROBILINOGEN UR STRIP-ACNC: 2 E.U./DL (ref 0.2–1)
WBC # BLD AUTO: 5.8 THOU/UL (ref 4–11)

## 2021-02-01 NOTE — EKG
[place] [address]
Phone:  [phone]                     ELECTROCARDIOGRAM REPORT      
_______________________________________________________________________________
 
Name:         SANTOS MCFARLANE                   Room:                     [facility]#:    [id number]     Account #:     [id number]  
Admission:    21    Attend Phys:                     
Discharge:    21    Date of Birth: [birth year]  
Date of Service: 21 1219  Report #:      [id number]
        [id number]
_______________________________________________________________________________
THIS REPORT FOR:  //name//                      
 
                         [facility] ED
                                       
Test Date:    2021               Test Time:    12:19:04
Pat Name:     SANTOS MCFARLANE                Department:   
Patient ID:   [id number]            Room:          
Gender:                               Technician:   MARILEE
:          1965               Requested By: Elvis Galindo
Order Number: [id number]    Reading MD:   South Shi
                                 Measurements
Intervals                              Axis          
Rate:         129                      P:            53
[place]:           147                      QRS:          13
QRSD:         83                       T:            82
QT:           304                                    
QTc:          446                                    
                           Interpretive Statements
Sinus tachycardia
Atrial premature complex
LAE, consider biatrial enlargement
Abnormal R-wave progression, early transition
Nonspecific T abnormalities, lateral leads
Baseline wander in lead(s) I,III,aVR,aVL
Compared to ECG 2021 14:36:15
Atrial premature complex(es) now present
Electronically Signed On 2021 13:21:29 CST by South Shi
[email or web address]
 
 
 
 
 
 
 
 
 
 
 
 
 
 
 
 
  <ELECTRONICALLY SIGNED>
                                           By: South Shi MD, FACC      
  21     1321
D: 21 1219   _____________________________________
T: 21 1219   South Shi MD, [facility]        /EPI

## 2021-02-04 ENCOUNTER — HOSPITAL ENCOUNTER (EMERGENCY)
Dept: HOSPITAL 96 - M.ERS | Age: 56
Discharge: HOME | End: 2021-02-04
Payer: COMMERCIAL

## 2021-02-04 VITALS — SYSTOLIC BLOOD PRESSURE: 128 MMHG | DIASTOLIC BLOOD PRESSURE: 81 MMHG

## 2021-02-04 VITALS — WEIGHT: 155.01 LBS | BODY MASS INDEX: 24.91 KG/M2 | HEIGHT: 66 IN

## 2021-02-04 DIAGNOSIS — S00.83XA: Primary | ICD-10-CM

## 2021-02-04 DIAGNOSIS — J44.9: ICD-10-CM

## 2021-02-04 DIAGNOSIS — Z79.899: ICD-10-CM

## 2021-02-04 DIAGNOSIS — Z98.51: ICD-10-CM

## 2021-02-04 DIAGNOSIS — E87.6: ICD-10-CM

## 2021-02-04 DIAGNOSIS — Z90.710: ICD-10-CM

## 2021-02-04 DIAGNOSIS — G89.29: ICD-10-CM
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
## 2021-02-04 DIAGNOSIS — K31.84: ICD-10-CM
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
## 2021-02-04 DIAGNOSIS — R55: ICD-10-CM
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
## 2021-02-04 LAB
ABSOLUTE BASOPHILS: 0.1 THOU/UL (ref 0–0.2)
ABSOLUTE EOSINOPHILS: 0.1 THOU/UL (ref 0–0.7)
ABSOLUTE MONOCYTES: 0.6 THOU/UL (ref 0–1.2)
ALBUMIN SERPL-MCNC: 3.3 G/DL (ref 3.4–5)
ALP SERPL-CCNC: 158 U/L (ref 46–116)
ALT SERPL-CCNC: 27 U/L (ref 30–65)
AMYLASE SERPL-CCNC: 20 U/L (ref 25–115)
ANION GAP SERPL CALC-SCNC: 8 MMOL/L (ref 7–16)
AST SERPL-CCNC: 12 U/L (ref 15–37)
BASOPHILS NFR BLD AUTO: 0.8 %
BILIRUB DIRECT SERPL-MCNC: 0.2 MG/DL
BILIRUB SERPL-MCNC: 0.6 MG/DL
BILIRUB UR-MCNC: NEGATIVE MG/DL
BUN SERPL-MCNC: 10 MG/DL (ref 7–18)
CALCIUM SERPL-MCNC: 9.2 MG/DL (ref 8.5–10.1)
CHLORIDE SERPL-SCNC: 103 MMOL/L (ref 98–107)
CO2 SERPL-SCNC: 27 MMOL/L (ref 21–32)
COLOR UR: YELLOW
CREAT SERPL-MCNC: 1.1 MG/DL (ref 0.6–1.3)
EOSINOPHIL NFR BLD: 1.2 %
GLUCOSE SERPL-MCNC: 125 MG/DL (ref 70–99)
GRANULOCYTES NFR BLD MANUAL: 76.3 %
HCT VFR BLD CALC: 40.8 % (ref 37–47)
HGB BLD-MCNC: 13.8 GM/DL (ref 12–15)
KETONES UR STRIP-MCNC: NEGATIVE MG/DL
LIPASE: 54 U/L (ref 73–393)
LYMPHOCYTES # BLD: 1 THOU/UL (ref 0.8–5.3)
LYMPHOCYTES NFR BLD AUTO: 13.5 %
MAGNESIUM SERPL-MCNC: 1.8 MG/DL (ref 1.8–2.4)
MCH RBC QN AUTO: 31.1 PG (ref 26–34)
MCHC RBC AUTO-ENTMCNC: 33.9 G/DL (ref 28–37)
MCV RBC: 91.7 FL (ref 80–100)
MONOCYTES NFR BLD: 8.2 %
MPV: 8 FL. (ref 7.2–11.1)
NEUTROPHILS # BLD: 5.4 THOU/UL (ref 1.6–8.1)
NITRITE UR QL STRIP: NEGATIVE
NT-PRO BRAIN NAT PEPTIDE: 95 PG/ML (ref ?–300)
NUCLEATED RBCS: 0 /100WBC
PLATELET COUNT*: 206 THOU/UL (ref 150–400)
POTASSIUM SERPL-SCNC: 2.8 MMOL/L (ref 3.5–5.1)
PROT SERPL-MCNC: 6.5 G/DL (ref 6.4–8.2)
PROT UR QL STRIP: (no result)
RBC # BLD AUTO: 4.45 MIL/UL (ref 4.2–5)
RBC # UR STRIP: NEGATIVE /UL
RDW-CV: 14.4 % (ref 10.5–14.5)
SODIUM SERPL-SCNC: 138 MMOL/L (ref 136–145)
SP GR UR STRIP: 1.02 (ref 1–1.03)
URINE CLARITY: CLEAR
URINE GLUCOSE-RANDOM: (no result)
URINE LEUKOCYTES: NEGATIVE
UROBILINOGEN UR STRIP-ACNC: 2 E.U./DL (ref 0.2–1)
WBC # BLD AUTO: 7.1 THOU/UL (ref 4–11)

## 2021-03-21 ENCOUNTER — HOSPITAL ENCOUNTER (EMERGENCY)
Dept: HOSPITAL 96 - M.ERS | Age: 56
Discharge: HOME | End: 2021-03-21
Payer: COMMERCIAL

## 2021-03-21 VITALS — WEIGHT: 164.99 LBS | HEIGHT: 66 IN | BODY MASS INDEX: 26.52 KG/M2

## 2021-03-21 VITALS — SYSTOLIC BLOOD PRESSURE: 153 MMHG | DIASTOLIC BLOOD PRESSURE: 93 MMHG

## 2021-03-21 DIAGNOSIS — K31.84: ICD-10-CM

## 2021-03-21 DIAGNOSIS — Z87.891: ICD-10-CM

## 2021-03-21 DIAGNOSIS — Z90.710: ICD-10-CM

## 2021-03-21 DIAGNOSIS — Z85.118: ICD-10-CM

## 2021-03-21 DIAGNOSIS — J44.9: ICD-10-CM

## 2021-03-21 DIAGNOSIS — Z90.49: ICD-10-CM

## 2021-03-21 DIAGNOSIS — Z98.51: ICD-10-CM

## 2021-03-21 DIAGNOSIS — G25.81: ICD-10-CM

## 2021-03-21 DIAGNOSIS — Z96.652: ICD-10-CM

## 2021-03-21 DIAGNOSIS — R11.2: Primary | ICD-10-CM

## 2021-03-21 DIAGNOSIS — G89.29: ICD-10-CM

## 2021-03-21 DIAGNOSIS — Z20.822: ICD-10-CM

## 2021-03-21 LAB
ABSOLUTE BASOPHILS: 0 THOU/UL (ref 0–0.2)
ABSOLUTE EOSINOPHILS: 0 THOU/UL (ref 0–0.7)
ABSOLUTE MONOCYTES: 0.6 THOU/UL (ref 0–1.2)
ALBUMIN SERPL-MCNC: 3.8 G/DL (ref 3.4–5)
ALP SERPL-CCNC: 112 U/L (ref 46–116)
ALT SERPL-CCNC: 19 U/L (ref 30–65)
ANION GAP SERPL CALC-SCNC: 13 MMOL/L (ref 7–16)
AST SERPL-CCNC: 14 U/L (ref 15–37)
BASOPHILS NFR BLD AUTO: 0.5 %
BILIRUB SERPL-MCNC: 0.6 MG/DL
BUN SERPL-MCNC: 22 MG/DL (ref 7–18)
CALCIUM SERPL-MCNC: 8.9 MG/DL (ref 8.5–10.1)
CHLORIDE SERPL-SCNC: 101 MMOL/L (ref 98–107)
CO2 SERPL-SCNC: 24 MMOL/L (ref 21–32)
CREAT SERPL-MCNC: 1.1 MG/DL (ref 0.6–1.3)
EOSINOPHIL NFR BLD: 0.3 %
GLUCOSE SERPL-MCNC: 147 MG/DL (ref 70–99)
GRANULOCYTES NFR BLD MANUAL: 81.1 %
HCT VFR BLD CALC: 41.6 % (ref 37–47)
HGB BLD-MCNC: 13.9 GM/DL (ref 12–15)
LIPASE: 110 U/L (ref 73–393)
LYMPHOCYTES # BLD: 0.8 THOU/UL (ref 0.8–5.3)
LYMPHOCYTES NFR BLD AUTO: 10.6 %
MCH RBC QN AUTO: 31.5 PG (ref 26–34)
MCHC RBC AUTO-ENTMCNC: 33.4 G/DL (ref 28–37)
MCV RBC: 94.3 FL (ref 80–100)
MONOCYTES NFR BLD: 7.5 %
MPV: 7.2 FL. (ref 7.2–11.1)
NEUTROPHILS # BLD: 6 THOU/UL (ref 1.6–8.1)
NUCLEATED RBCS: 0 /100WBC
PLATELET COUNT*: 305 THOU/UL (ref 150–400)
POTASSIUM SERPL-SCNC: 3.5 MMOL/L (ref 3.5–5.1)
PROT SERPL-MCNC: 7.7 G/DL (ref 6.4–8.2)
RBC # BLD AUTO: 4.41 MIL/UL (ref 4.2–5)
RDW-CV: 15.2 % (ref 10.5–14.5)
SODIUM SERPL-SCNC: 138 MMOL/L (ref 136–145)
WBC # BLD AUTO: 7.4 THOU/UL (ref 4–11)

## 2021-03-22 NOTE — EKG
[place] [address]
Phone:  [phone]                     ELECTROCARDIOGRAM REPORT      
_______________________________________________________________________________
 
Name:         SANTOS MCFARLANE                   Room:                     [facility]#:    [id number]     Account #:     [id number]  
Admission:    21    Attend Phys:                     
Discharge:    21    Date of Birth: [birth year]  
Date of Service: 21 1900  Report #:      [id number]
        [id number]
_______________________________________________________________________________
THIS REPORT FOR:  //name//                      
 
                         [facility] ED
                                       
Test Date:    2021               Test Time:    19:00:13
Pat Name:     SANTOS MCFARLANE                Department:   
Patient ID:   [id number]            Room:          
Gender:                               Technician:   
:          1965               Requested By: Carrie Mclean
Order Number: [id number]    Yuki MD:   Hans Watts
                                 Measurements
Intervals                              Axis          
Rate:         115                      P:            64
[place]:           140                      QRS:          9
QRSD:         93                       T:            81
QT:           330                                    
QTc:          457                                    
                           Interpretive Statements
Sinus tachycardia
Consider right atrial enlargement
Abnormal R-wave progression, early transition
Compared to ECG 2021 18:51:31
Early repolarization no longer present
Possible ischemia no longer present
Electronically Signed On 3- 17:34:37 CDT by Hans Watts
[email or web address]
 
 
 
 
 
 
 
 
 
 
 
 
 
 
 
 
 
 
  <ELECTRONICALLY SIGNED>
                                           By: Hans Watts MD, FAC     
  21     1734
D: 21 1900   _____________________________________
T: 21 1900   Hans Watts MD, [facility]       /EPI

## 2021-03-22 NOTE — EKG
[place] [address]
Phone:  [phone]                     ELECTROCARDIOGRAM REPORT      
_______________________________________________________________________________
 
Name:         SANTOS MCFARLANE                   Room:                     [facility]#:    [id number]     Account #:     [id number]  
Admission:    21    Attend Phys:                     
Discharge:    21    Date of Birth: [birth year]  
Date of Service: 21  Report #:      [id number]
        [id number]
_______________________________________________________________________________
THIS REPORT FOR:  //name//                      
 
                         [facility] ED
                                       
Test Date:    2021               Test Time:    18:51:31
Pat Name:     SANTOS MCFARLANE                Department:   
Patient ID:   [id number]            Room:          
Gender:                               Technician:   
:          1965               Requested By: Carrie Mclean
Order Number: [id number]    Reading MD:   Juan Flynn
                                 Measurements
Intervals                              Axis          
Rate:         119                      P:            
[place]:                                    QRS:          -23
QRSD:         86                       T:            106
QT:           317                                    
QTc:          447                                    
                           Interpretive Statements
Sinus tachycardia 
borderline left axis deviation
Abnormal R-wave progression, early transition
Baseline artifact 
Compared to ECG 2021 12:19:04
Atrial premature complex(es) no longer present
T-wave abnormality no longer present
Electronically Signed On 3- 17:35:19 CDT by Juan Flynn
[email or web address]
 
 
 
 
 
 
 
 
 
 
 
 
 
 
 
 
 
  <ELECTRONICALLY SIGNED>
                                           By: Juan Flynn MD, FACC   
  21     1735
D: 21   _____________________________________
T: 21   Juan Flynn MD, FAC     /EPI

## 2021-05-17 ENCOUNTER — HOSPITAL ENCOUNTER (EMERGENCY)
Dept: HOSPITAL 96 - M.ERS | Age: 56
Discharge: HOME | End: 2021-05-17
Payer: COMMERCIAL

## 2021-05-17 VITALS — DIASTOLIC BLOOD PRESSURE: 81 MMHG | SYSTOLIC BLOOD PRESSURE: 140 MMHG

## 2021-05-17 VITALS — HEIGHT: 66 IN | WEIGHT: 200 LBS | BODY MASS INDEX: 32.14 KG/M2

## 2021-05-17 VITALS — WEIGHT: 200 LBS | HEIGHT: 66 IN | BODY MASS INDEX: 32.14 KG/M2

## 2021-05-17 VITALS — DIASTOLIC BLOOD PRESSURE: 98 MMHG | SYSTOLIC BLOOD PRESSURE: 165 MMHG

## 2021-05-17 DIAGNOSIS — Z98.51: ICD-10-CM

## 2021-05-17 DIAGNOSIS — J44.9: ICD-10-CM

## 2021-05-17 DIAGNOSIS — K31.84: ICD-10-CM

## 2021-05-17 DIAGNOSIS — R11.15: Primary | ICD-10-CM

## 2021-05-17 DIAGNOSIS — Z90.49: ICD-10-CM

## 2021-05-17 DIAGNOSIS — Z87.891: ICD-10-CM

## 2021-05-17 DIAGNOSIS — Z96.652: ICD-10-CM

## 2021-05-17 DIAGNOSIS — M54.5: ICD-10-CM

## 2021-05-17 DIAGNOSIS — Z79.899: ICD-10-CM

## 2021-05-17 DIAGNOSIS — Z90.710: ICD-10-CM

## 2021-05-17 DIAGNOSIS — Z85.118: ICD-10-CM

## 2021-05-17 DIAGNOSIS — G25.81: ICD-10-CM

## 2021-05-17 DIAGNOSIS — F12.90: ICD-10-CM

## 2021-05-17 DIAGNOSIS — R11.2: Primary | ICD-10-CM

## 2021-05-17 DIAGNOSIS — G89.29: ICD-10-CM

## 2021-05-17 LAB
ABSOLUTE MONOCYTES: 0.3 THOU/UL (ref 0–1.2)
ABSOLUTE MONOCYTES: 0.4 THOU/UL (ref 0–1.2)
ALBUMIN SERPL-MCNC: 3.9 G/DL (ref 3.4–5)
ALP SERPL-CCNC: 120 U/L (ref 46–116)
ALT SERPL-CCNC: 14 U/L (ref 30–65)
ANION GAP SERPL CALC-SCNC: 6 MMOL/L (ref 7–16)
ANION GAP SERPL CALC-SCNC: 8 MMOL/L (ref 7–16)
AST SERPL-CCNC: 12 U/L (ref 15–37)
BACTERIA-REFLEX: (no result) /HPF
BILIRUB SERPL-MCNC: 0.2 MG/DL
BILIRUB UR-MCNC: NEGATIVE MG/DL
BILIRUB UR-MCNC: NEGATIVE MG/DL
BUN SERPL-MCNC: 19 MG/DL (ref 7–18)
BUN SERPL-MCNC: 22 MG/DL (ref 7–18)
CALCIUM SERPL-MCNC: 9.7 MG/DL (ref 8.5–10.1)
CALCIUM SERPL-MCNC: 9.7 MG/DL (ref 8.5–10.1)
CHLORIDE SERPL-SCNC: 100 MMOL/L (ref 98–107)
CHLORIDE SERPL-SCNC: 101 MMOL/L (ref 98–107)
CO2 SERPL-SCNC: 30 MMOL/L (ref 21–32)
CO2 SERPL-SCNC: 34 MMOL/L (ref 21–32)
COLOR UR: YELLOW
COLOR UR: YELLOW
CREAT SERPL-MCNC: 1.1 MG/DL (ref 0.6–1.3)
CREAT SERPL-MCNC: 1.1 MG/DL (ref 0.6–1.3)
GLUCOSE SERPL-MCNC: 172 MG/DL (ref 70–99)
GLUCOSE SERPL-MCNC: 187 MG/DL (ref 70–99)
GRANULOCYTES NFR BLD MANUAL: 89 %
GRANULOCYTES NFR BLD MANUAL: 91 %
HCT VFR BLD CALC: 44.1 % (ref 37–47)
HCT VFR BLD CALC: 47.2 % (ref 37–47)
HGB BLD-MCNC: 14.6 GM/DL (ref 12–15)
HGB BLD-MCNC: 15.7 GM/DL (ref 12–15)
KETONES UR STRIP-MCNC: NEGATIVE MG/DL
KETONES UR STRIP-MCNC: NEGATIVE MG/DL
LIPASE: 121 U/L (ref 73–393)
LYMPHOCYTES # BLD: 0.7 THOU/UL (ref 0.8–5.3)
LYMPHOCYTES # BLD: 1.1 THOU/UL (ref 0.8–5.3)
LYMPHOCYTES NFR BLD AUTO: 6 %
LYMPHOCYTES NFR BLD AUTO: 7 %
MCH RBC QN AUTO: 29.7 PG (ref 26–34)
MCH RBC QN AUTO: 29.9 PG (ref 26–34)
MCHC RBC AUTO-ENTMCNC: 33 G/DL (ref 28–37)
MCHC RBC AUTO-ENTMCNC: 33.2 G/DL (ref 28–37)
MCV RBC: 89.4 FL (ref 80–100)
MCV RBC: 90.6 FL (ref 80–100)
MONOCYTES NFR BLD: 3 %
MONOCYTES NFR BLD: 3 %
MPV: 7.3 FL. (ref 7.2–11.1)
MPV: 7.5 FL. (ref 7.2–11.1)
NEUTROPHILS # BLD: 10.5 THOU/UL (ref 1.6–8.1)
NEUTROPHILS # BLD: 11.8 THOU/UL (ref 1.6–8.1)
NUCLEATED RBCS: 0 /100WBC
NUCLEATED RBCS: 0 /100WBC
PLATELET # BLD EST: ADEQUATE 10*3/UL
PLATELET # BLD EST: ADEQUATE 10*3/UL
PLATELET COUNT*: 244 THOU/UL (ref 150–400)
PLATELET COUNT*: 353 THOU/UL (ref 150–400)
POTASSIUM SERPL-SCNC: 3.7 MMOL/L (ref 3.5–5.1)
POTASSIUM SERPL-SCNC: 3.9 MMOL/L (ref 3.5–5.1)
PROT SERPL-MCNC: 8.3 G/DL (ref 6.4–8.2)
PROT UR QL STRIP: (no result)
PROT UR QL STRIP: (no result)
RBC # BLD AUTO: 4.87 MIL/UL (ref 4.2–5)
RBC # BLD AUTO: 5.28 MIL/UL (ref 4.2–5)
RBC # UR STRIP: (no result) /UL
RBC # UR STRIP: NEGATIVE /UL
RBC #/AREA URNS HPF: (no result) /HPF (ref 0–2)
RBC MORPH BLD: NORMAL
RBC MORPH BLD: NORMAL
RDW-CV: 14.8 % (ref 10.5–14.5)
RDW-CV: 15.1 % (ref 10.5–14.5)
SODIUM SERPL-SCNC: 139 MMOL/L (ref 136–145)
SODIUM SERPL-SCNC: 140 MMOL/L (ref 136–145)
SP GR UR STRIP: <= 1.005 (ref 1–1.03)
SP GR UR STRIP: <= 1.005 (ref 1–1.03)
SQUAMOUS: (no result) /LPF (ref 0–3)
SQUAMOUS: (no result) /LPF (ref 0–3)
THC: POSITIVE
URINE CLARITY: CLEAR
URINE CLARITY: CLEAR
URINE GLUCOSE-RANDOM: NEGATIVE
URINE GLUCOSE-RANDOM: NEGATIVE
URINE LEUKOCYTES-REFLEX: NEGATIVE
URINE LEUKOCYTES-REFLEX: NEGATIVE
URINE NITRITE-REFLEX: NEGATIVE
URINE NITRITE-REFLEX: NEGATIVE
URINE WBC-REFLEX: (no result) /HPF (ref 0–5)
UROBILINOGEN UR STRIP-ACNC: 0.2 E.U./DL (ref 0.2–1)
UROBILINOGEN UR STRIP-ACNC: 0.2 E.U./DL (ref 0.2–1)
VARIANT LYMPHS NFR BLD MANUAL: 1 %
WBC # BLD AUTO: 11.5 THOU/UL (ref 4–11)
WBC # BLD AUTO: 13.3 THOU/UL (ref 4–11)

## 2021-05-17 NOTE — EKG
[place] [address]
Phone:  [phone]                     ELECTROCARDIOGRAM REPORT      
_______________________________________________________________________________
 
Name:         MAEVESANTOS CONNER                   Room:                     [facility] 
M.R.#:    [id number]     Account #:     [id number]  
Admission:    21    Attend Phys:                     
Discharge:                Date of Birth: [birth year]  
Date of Service: 21 0759  Report #:      [id number]
        [id number]
_______________________________________________________________________________
THIS REPORT FOR:  //name//                      
 
                         [facility] ED
                                       
Test Date:    2021               Test Time:    07:59:49
Pat Name:     SANTOS MCFARLANE                Department:   
Patient ID:   [id number]            Room:          
Gender:       F                        Technician:   
:          1965               Requested By: Rogelio Fields
Order Number: [id number] MD:   South Shi
                                 Measurements
Intervals                              Axis          
Rate:         128                      P:            
GA:                                    QRS:          -13
QRSD:         84                       T:            86
QT:           300                                    
QTc:          438                                    
                           Interpretive Statements
sinus tachycardia
Abnormal R-wave progression, early transition
Artifact in lead(s) II,aVR,aVF,V2,V4,V5,V6
Compared to ECG 2021 19:00:13
no change
Electronically Signed On 2021 10:36:39 CDT by South Shi
[email or web address]
 
 
 
 
 
 
 
 
 
 
 
 
 
 
 
 
 
 
 
  <ELECTRONICALLY SIGNED>
                                           By: South Shi MD, [facility]      
  21     1036
D: 21 0759   _____________________________________
T: 21 0759   South Shi MD, [facility]        /EPI

## 2021-05-19 ENCOUNTER — HOSPITAL ENCOUNTER (INPATIENT)
Dept: HOSPITAL 96 - M.ERS | Age: 56
LOS: 4 days | Discharge: HOME | DRG: 440 | End: 2021-05-23
Attending: FAMILY MEDICINE | Admitting: FAMILY MEDICINE
Payer: COMMERCIAL

## 2021-05-19 VITALS — WEIGHT: 200 LBS | BODY MASS INDEX: 31.39 KG/M2 | HEIGHT: 67.01 IN

## 2021-05-19 VITALS — DIASTOLIC BLOOD PRESSURE: 92 MMHG | SYSTOLIC BLOOD PRESSURE: 162 MMHG

## 2021-05-19 VITALS — DIASTOLIC BLOOD PRESSURE: 108 MMHG | SYSTOLIC BLOOD PRESSURE: 183 MMHG

## 2021-05-19 VITALS — DIASTOLIC BLOOD PRESSURE: 93 MMHG | SYSTOLIC BLOOD PRESSURE: 157 MMHG

## 2021-05-19 DIAGNOSIS — Z96.652: ICD-10-CM

## 2021-05-19 DIAGNOSIS — Z79.899: ICD-10-CM

## 2021-05-19 DIAGNOSIS — K31.84: ICD-10-CM

## 2021-05-19 DIAGNOSIS — F41.1: ICD-10-CM

## 2021-05-19 DIAGNOSIS — M54.9: ICD-10-CM

## 2021-05-19 DIAGNOSIS — G47.00: ICD-10-CM

## 2021-05-19 DIAGNOSIS — G62.9: ICD-10-CM

## 2021-05-19 DIAGNOSIS — F32.9: ICD-10-CM

## 2021-05-19 DIAGNOSIS — G25.81: ICD-10-CM

## 2021-05-19 DIAGNOSIS — Z85.118: ICD-10-CM

## 2021-05-19 DIAGNOSIS — Z90.710: ICD-10-CM

## 2021-05-19 DIAGNOSIS — F12.90: ICD-10-CM

## 2021-05-19 DIAGNOSIS — Z20.822: ICD-10-CM

## 2021-05-19 DIAGNOSIS — G89.29: ICD-10-CM

## 2021-05-19 DIAGNOSIS — J44.9: ICD-10-CM

## 2021-05-19 DIAGNOSIS — K85.90: Primary | ICD-10-CM

## 2021-05-19 LAB
ABSOLUTE BASOPHILS: 0.1 THOU/UL (ref 0–0.2)
ABSOLUTE EOSINOPHILS: 0 THOU/UL (ref 0–0.7)
ABSOLUTE MONOCYTES: 0.5 THOU/UL (ref 0–1.2)
ALBUMIN SERPL-MCNC: 4 G/DL (ref 3.4–5)
ALP SERPL-CCNC: 123 U/L (ref 46–116)
ALT SERPL-CCNC: 28 U/L (ref 30–65)
ANION GAP SERPL CALC-SCNC: 9 MMOL/L (ref 7–16)
AST SERPL-CCNC: 21 U/L (ref 15–37)
BASOPHILS NFR BLD AUTO: 0.7 %
BILIRUB SERPL-MCNC: 0.4 MG/DL
BILIRUB UR-MCNC: NEGATIVE MG/DL
BUN SERPL-MCNC: 18 MG/DL (ref 7–18)
CALCIUM SERPL-MCNC: 9.8 MG/DL (ref 8.5–10.1)
CHLORIDE SERPL-SCNC: 102 MMOL/L (ref 98–107)
CO2 SERPL-SCNC: 26 MMOL/L (ref 21–32)
COLOR UR: YELLOW
CREAT SERPL-MCNC: 0.9 MG/DL (ref 0.6–1.3)
EOSINOPHIL NFR BLD: 0.4 %
GLUCOSE SERPL-MCNC: 136 MG/DL (ref 70–99)
GRANULOCYTES NFR BLD MANUAL: 83.3 %
HCT VFR BLD CALC: 46 % (ref 37–47)
HGB BLD-MCNC: 15.5 GM/DL (ref 12–15)
KETONES UR STRIP-MCNC: NEGATIVE MG/DL
LIPASE: 425 U/L (ref 73–393)
LYMPHOCYTES # BLD: 0.9 THOU/UL (ref 0.8–5.3)
LYMPHOCYTES NFR BLD AUTO: 10.1 %
MCH RBC QN AUTO: 30 PG (ref 26–34)
MCHC RBC AUTO-ENTMCNC: 33.8 G/DL (ref 28–37)
MCV RBC: 89 FL (ref 80–100)
MONOCYTES NFR BLD: 5.5 %
MPV: 7.4 FL. (ref 7.2–11.1)
NEUTROPHILS # BLD: 7.7 THOU/UL (ref 1.6–8.1)
NITRITE UR QL STRIP: NEGATIVE
NUCLEATED RBCS: 0 /100WBC
PLATELET COUNT*: 278 THOU/UL (ref 150–400)
POTASSIUM SERPL-SCNC: 3.9 MMOL/L (ref 3.5–5.1)
PROT SERPL-MCNC: 8.3 G/DL (ref 6.4–8.2)
PROT UR QL STRIP: NEGATIVE
RBC # BLD AUTO: 5.17 MIL/UL (ref 4.2–5)
RBC # UR STRIP: NEGATIVE /UL
RDW-CV: 14.6 % (ref 10.5–14.5)
SODIUM SERPL-SCNC: 137 MMOL/L (ref 136–145)
SP GR UR STRIP: 1.02 (ref 1–1.03)
THC: POSITIVE
URINE CLARITY: CLEAR
URINE GLUCOSE-RANDOM: NEGATIVE
URINE LEUKOCYTES: NEGATIVE
UROBILINOGEN UR STRIP-ACNC: 0.2 E.U./DL (ref 0.2–1)
WBC # BLD AUTO: 9.2 THOU/UL (ref 4–11)

## 2021-05-20 VITALS — DIASTOLIC BLOOD PRESSURE: 90 MMHG | SYSTOLIC BLOOD PRESSURE: 157 MMHG

## 2021-05-20 VITALS — DIASTOLIC BLOOD PRESSURE: 92 MMHG | SYSTOLIC BLOOD PRESSURE: 170 MMHG

## 2021-05-20 VITALS — DIASTOLIC BLOOD PRESSURE: 107 MMHG | SYSTOLIC BLOOD PRESSURE: 178 MMHG

## 2021-05-20 NOTE — EKG
[place] [address]
Phone:  [phone]                     ELECTROCARDIOGRAM REPORT      
_______________________________________________________________________________
 
Name:         MAEVESANTOS                   Room:          [address]    ADM IN 
..#:    [id number]     Account #:     [id number]  
Admission:    21    Attend Phys:   Tona De La Paz MD 
Discharge:                Date of Birth: [birth year]  
Date of Service: 21  Report #:      [id number]
        [id number]
_______________________________________________________________________________
THIS REPORT FOR:  //name//                      
 
                         [facility] ED
                                       
Test Date:    2021               Test Time:    19:33:03
Pat Name:     SANTOS MCFARLANE                Department:   
Patient ID:   [id number]            Room:         [facility]
Gender:       F                        Technician:   FL
:          1965               Requested By: Shanthi Salgado
Order Number: [id number] MD:   South Shi
                                 Measurements
Intervals                              Axis          
Rate:         125                      P:            65
[place]:           152                      QRS:          -29
QRSD:         100                      T:            78
QT:           320                                    
QTc:          462                                    
                           Interpretive Statements
Sinus tachycardia
Borderline left axis deviation
Artifact in lead(s) I,II,III,aVR,aVL,aVF,V2,V4 and baseline wander in lead(s)
 
V1,V2
Compared to ECG 2021 07:59:49
No significant changes
Electronically Signed On 2021 9:39:35 CDT by South Shi
[email or web address]
 
 
 
 
 
 
 
 
 
 
 
 
 
 
 
 
 
  <ELECTRONICALLY SIGNED>
                                           By: South Shi MD, [facility]      
  21     0939
D: 21   _____________________________________
T: 21   South Shi MD, [facility]        /EPI

## 2021-05-21 VITALS — DIASTOLIC BLOOD PRESSURE: 92 MMHG | SYSTOLIC BLOOD PRESSURE: 157 MMHG

## 2021-05-21 VITALS — DIASTOLIC BLOOD PRESSURE: 91 MMHG | SYSTOLIC BLOOD PRESSURE: 149 MMHG

## 2021-05-21 VITALS — DIASTOLIC BLOOD PRESSURE: 95 MMHG | SYSTOLIC BLOOD PRESSURE: 162 MMHG

## 2021-05-21 LAB
ANION GAP SERPL CALC-SCNC: 8 MMOL/L (ref 7–16)
BUN SERPL-MCNC: 10 MG/DL (ref 7–18)
CALCIUM SERPL-MCNC: 8.4 MG/DL (ref 8.5–10.1)
CHLORIDE SERPL-SCNC: 104 MMOL/L (ref 98–107)
CO2 SERPL-SCNC: 24 MMOL/L (ref 21–32)
CREAT SERPL-MCNC: 0.6 MG/DL (ref 0.6–1.3)
GLUCOSE SERPL-MCNC: 84 MG/DL (ref 70–99)
HCT VFR BLD CALC: 41.5 % (ref 37–47)
HGB BLD-MCNC: 14 GM/DL (ref 12–15)
MCH RBC QN AUTO: 30.1 PG (ref 26–34)
MCHC RBC AUTO-ENTMCNC: 33.7 G/DL (ref 28–37)
MCV RBC: 89.3 FL (ref 80–100)
MPV: 7.5 FL. (ref 7.2–11.1)
PLATELET COUNT*: 217 THOU/UL (ref 150–400)
POTASSIUM SERPL-SCNC: 3.6 MMOL/L (ref 3.5–5.1)
RBC # BLD AUTO: 4.65 MIL/UL (ref 4.2–5)
RDW-CV: 14.4 % (ref 10.5–14.5)
SODIUM SERPL-SCNC: 136 MMOL/L (ref 136–145)
WBC # BLD AUTO: 7.5 THOU/UL (ref 4–11)

## 2021-05-22 VITALS — SYSTOLIC BLOOD PRESSURE: 151 MMHG | DIASTOLIC BLOOD PRESSURE: 89 MMHG

## 2021-05-22 VITALS — DIASTOLIC BLOOD PRESSURE: 82 MMHG | SYSTOLIC BLOOD PRESSURE: 140 MMHG

## 2021-05-22 VITALS — SYSTOLIC BLOOD PRESSURE: 159 MMHG | DIASTOLIC BLOOD PRESSURE: 97 MMHG

## 2021-05-22 LAB
ANION GAP SERPL CALC-SCNC: 8 MMOL/L (ref 7–16)
BILIRUB UR-MCNC: (no result) MG/DL
BUN SERPL-MCNC: 9 MG/DL (ref 7–18)
CALCIUM SERPL-MCNC: 8.4 MG/DL (ref 8.5–10.1)
CHLORIDE SERPL-SCNC: 105 MMOL/L (ref 98–107)
CO2 SERPL-SCNC: 25 MMOL/L (ref 21–32)
COLOR UR: YELLOW
CREAT SERPL-MCNC: 0.6 MG/DL (ref 0.6–1.3)
GLUCOSE SERPL-MCNC: 80 MG/DL (ref 70–99)
HCT VFR BLD CALC: 40.5 % (ref 37–47)
HGB BLD-MCNC: 13.4 GM/DL (ref 12–15)
KETONES UR STRIP-MCNC: (no result) MG/DL
MCH RBC QN AUTO: 29.6 PG (ref 26–34)
MCHC RBC AUTO-ENTMCNC: 33 G/DL (ref 28–37)
MCV RBC: 89.7 FL (ref 80–100)
MPV: 7.5 FL. (ref 7.2–11.1)
NITRITE UR QL STRIP: NEGATIVE
PLATELET COUNT*: 198 THOU/UL (ref 150–400)
POTASSIUM SERPL-SCNC: 3.5 MMOL/L (ref 3.5–5.1)
PROT UR QL STRIP: NEGATIVE
RBC # BLD AUTO: 4.51 MIL/UL (ref 4.2–5)
RBC # UR STRIP: (no result) /UL
RDW-CV: 14.6 % (ref 10.5–14.5)
SODIUM SERPL-SCNC: 138 MMOL/L (ref 136–145)
SP GR UR STRIP: 1.02 (ref 1–1.03)
URINE CLARITY: CLEAR
URINE GLUCOSE-RANDOM: (no result)
URINE LEUKOCYTES: NEGATIVE
UROBILINOGEN UR STRIP-ACNC: 4 E.U./DL (ref 0.2–1)
WBC # BLD AUTO: 6 THOU/UL (ref 4–11)

## 2021-05-23 VITALS — DIASTOLIC BLOOD PRESSURE: 89 MMHG | SYSTOLIC BLOOD PRESSURE: 160 MMHG

## 2021-05-23 VITALS — SYSTOLIC BLOOD PRESSURE: 160 MMHG | DIASTOLIC BLOOD PRESSURE: 89 MMHG

## 2021-05-23 LAB
ANION GAP SERPL CALC-SCNC: 8 MMOL/L (ref 7–16)
BUN SERPL-MCNC: 10 MG/DL (ref 7–18)
CALCIUM SERPL-MCNC: 8.6 MG/DL (ref 8.5–10.1)
CHLORIDE SERPL-SCNC: 103 MMOL/L (ref 98–107)
CO2 SERPL-SCNC: 26 MMOL/L (ref 21–32)
CREAT SERPL-MCNC: 0.6 MG/DL (ref 0.6–1.3)
GLUCOSE SERPL-MCNC: 86 MG/DL (ref 70–99)
HCT VFR BLD CALC: 41.7 % (ref 37–47)
HGB BLD-MCNC: 13.8 GM/DL (ref 12–15)
MCH RBC QN AUTO: 29.2 PG (ref 26–34)
MCHC RBC AUTO-ENTMCNC: 33.1 G/DL (ref 28–37)
MCV RBC: 88.1 FL (ref 80–100)
MPV: 7.7 FL. (ref 7.2–11.1)
PLATELET COUNT*: 214 THOU/UL (ref 150–400)
POTASSIUM SERPL-SCNC: 3.3 MMOL/L (ref 3.5–5.1)
RBC # BLD AUTO: 4.73 MIL/UL (ref 4.2–5)
RDW-CV: 14.8 % (ref 10.5–14.5)
SODIUM SERPL-SCNC: 137 MMOL/L (ref 136–145)
WBC # BLD AUTO: 6.7 THOU/UL (ref 4–11)

## 2021-06-08 ENCOUNTER — HOSPITAL ENCOUNTER (EMERGENCY)
Dept: HOSPITAL 96 - M.ERS | Age: 56
LOS: 1 days | Discharge: HOME | End: 2021-06-09
Payer: COMMERCIAL

## 2021-06-08 VITALS — HEIGHT: 66 IN | BODY MASS INDEX: 32.14 KG/M2 | WEIGHT: 200 LBS

## 2021-06-08 DIAGNOSIS — Z90.49: ICD-10-CM

## 2021-06-08 DIAGNOSIS — Z79.899: ICD-10-CM

## 2021-06-08 DIAGNOSIS — Z96.652: ICD-10-CM

## 2021-06-08 DIAGNOSIS — Z87.891: ICD-10-CM

## 2021-06-08 DIAGNOSIS — Z20.822: ICD-10-CM

## 2021-06-08 DIAGNOSIS — Z90.711: ICD-10-CM

## 2021-06-08 DIAGNOSIS — Z85.118: ICD-10-CM

## 2021-06-08 DIAGNOSIS — J44.9: ICD-10-CM

## 2021-06-08 DIAGNOSIS — F41.1: ICD-10-CM

## 2021-06-08 DIAGNOSIS — K59.00: ICD-10-CM

## 2021-06-08 DIAGNOSIS — G62.9: ICD-10-CM

## 2021-06-08 DIAGNOSIS — F32.9: ICD-10-CM

## 2021-06-08 DIAGNOSIS — R11.15: Primary | ICD-10-CM

## 2021-06-08 DIAGNOSIS — Z98.51: ICD-10-CM

## 2021-06-08 LAB
ALBUMIN SERPL-MCNC: 4 G/DL (ref 3.4–5)
ALP SERPL-CCNC: 168 U/L (ref 46–116)
ALT SERPL-CCNC: 23 U/L (ref 30–65)
ANION GAP SERPL CALC-SCNC: 9 MMOL/L (ref 7–16)
AST SERPL-CCNC: 28 U/L (ref 15–37)
BILIRUB SERPL-MCNC: 0.3 MG/DL
BILIRUB UR-MCNC: NEGATIVE MG/DL
BUN SERPL-MCNC: 29 MG/DL (ref 7–18)
CALCIUM SERPL-MCNC: 9.4 MG/DL (ref 8.5–10.1)
CHLORIDE SERPL-SCNC: 101 MMOL/L (ref 98–107)
CO2 SERPL-SCNC: 26 MMOL/L (ref 21–32)
COLOR UR: YELLOW
CREAT SERPL-MCNC: 1 MG/DL (ref 0.6–1.3)
GLUCOSE SERPL-MCNC: 154 MG/DL (ref 70–99)
HCT VFR BLD CALC: 44.8 % (ref 37–47)
HGB BLD-MCNC: 15.1 GM/DL (ref 12–15)
KETONES UR STRIP-MCNC: NEGATIVE MG/DL
LIPASE: 149 U/L (ref 73–393)
MCH RBC QN AUTO: 29.5 PG (ref 26–34)
MCHC RBC AUTO-ENTMCNC: 33.8 G/DL (ref 28–37)
MCV RBC: 87.2 FL (ref 80–100)
MPV: 7 FL. (ref 7.2–11.1)
NUCLEATED RBCS: 0 /100WBC
PLATELET COUNT*: 282 THOU/UL (ref 150–400)
POTASSIUM SERPL-SCNC: 4 MMOL/L (ref 3.5–5.1)
PROT SERPL-MCNC: 8.1 G/DL (ref 6.4–8.2)
PROT UR QL STRIP: NEGATIVE
RBC # BLD AUTO: 5.13 MIL/UL (ref 4.2–5)
RBC # UR STRIP: (no result) /UL
RDW-CV: 15.7 % (ref 10.5–14.5)
SODIUM SERPL-SCNC: 136 MMOL/L (ref 136–145)
SP GR UR STRIP: >= 1.03 (ref 1–1.03)
URINE CLARITY: CLEAR
URINE GLUCOSE-RANDOM: NEGATIVE
URINE LEUKOCYTES-REFLEX: NEGATIVE
URINE NITRITE-REFLEX: NEGATIVE
UROBILINOGEN UR STRIP-ACNC: 0.2 E.U./DL (ref 0.2–1)
WBC # BLD AUTO: 8.2 THOU/UL (ref 4–11)

## 2021-06-09 ENCOUNTER — HOSPITAL ENCOUNTER (EMERGENCY)
Dept: HOSPITAL 96 - M.ERS | Age: 56
Discharge: HOME | End: 2021-06-09
Payer: COMMERCIAL

## 2021-06-09 VITALS — DIASTOLIC BLOOD PRESSURE: 92 MMHG | SYSTOLIC BLOOD PRESSURE: 163 MMHG

## 2021-06-09 VITALS — HEIGHT: 66 IN | WEIGHT: 200 LBS | BODY MASS INDEX: 32.14 KG/M2

## 2021-06-09 VITALS — DIASTOLIC BLOOD PRESSURE: 92 MMHG | SYSTOLIC BLOOD PRESSURE: 167 MMHG

## 2021-06-09 DIAGNOSIS — G62.9: ICD-10-CM

## 2021-06-09 DIAGNOSIS — F41.9: ICD-10-CM

## 2021-06-09 DIAGNOSIS — Z90.710: ICD-10-CM

## 2021-06-09 DIAGNOSIS — Z85.118: ICD-10-CM

## 2021-06-09 DIAGNOSIS — Z87.891: ICD-10-CM

## 2021-06-09 DIAGNOSIS — K59.00: ICD-10-CM

## 2021-06-09 DIAGNOSIS — Z90.49: ICD-10-CM

## 2021-06-09 DIAGNOSIS — J44.9: ICD-10-CM

## 2021-06-09 DIAGNOSIS — Z98.51: ICD-10-CM

## 2021-06-09 DIAGNOSIS — R10.32: ICD-10-CM

## 2021-06-09 DIAGNOSIS — M25.562: ICD-10-CM

## 2021-06-09 DIAGNOSIS — Z79.899: ICD-10-CM

## 2021-06-09 DIAGNOSIS — F32.9: ICD-10-CM

## 2021-06-09 DIAGNOSIS — R11.2: Primary | ICD-10-CM

## 2021-06-09 LAB
ABSOLUTE MONOCYTES: 0.3 THOU/UL (ref 0–1.2)
GIANT PLATELETS BLD QL SMEAR: (no result)
GRANULOCYTES NFR BLD MANUAL: 73 %
LYMPHOCYTES # BLD: 1.1 THOU/UL (ref 0.8–5.3)
LYMPHOCYTES NFR BLD AUTO: 14 %
MONOCYTES NFR BLD: 4 %
NEUTROPHILS # BLD: 6.7 THOU/UL (ref 1.6–8.1)
NEUTS BAND NFR BLD: 9 %
PLATELET # BLD EST: ADEQUATE 10*3/UL
POLYCHROMASIA BLD QL SMEAR: (no result)

## 2021-06-09 NOTE — EKG
[place] [address]
Phone:  [phone]                     ELECTROCARDIOGRAM REPORT      
_______________________________________________________________________________
 
Name:         SANTOS MCFARLANE                   Room:                     [facility]#:    [id number]     Account #:     [id number]  
Admission:    21    Attend Phys:                     
Discharge:    21    Date of Birth: [birth year]  
Date of Service: 21  Report #:      [id number]
        [id number]
_______________________________________________________________________________
THIS REPORT FOR:  //name//                      
 
                         [facility] ED
                                       
Test Date:    2021               Test Time:    22:13:26
Pat Name:     SANTOS MCFARLANE                Department:   
Patient ID:   [id number]            Room:          
Gender:                               Technician:   KARI
:          1965               Requested By: Carrie Mclean
Order Number: [id number] MD:   Hans Watts
                                 Measurements
Intervals                              Axis          
Rate:         118                      P:            47
KS:           160                      QRS:          -32
QRSD:         101                      T:            76
QT:           322                                    
QTc:          452                                    
                           Interpretive Statements
Sinus tachycardia
LAE, consider biatrial enlargement
Left axis deviation
Abnormal R-wave progression, early transition
Artifact in lead(s) I,II,III,aVR,aVL,aVF
Compared to ECG 2021 19:33:03
No significant interval change
Electronically Signed On 2021 14:14:24 CDT by Hans Watts
[email or web address]
 
 
 
 
 
 
 
 
 
 
 
 
 
 
 
 
 
  <ELECTRONICALLY SIGNED>
                                           By: Hans Watts MD, [facility]     
  21     1414
D: 21   _____________________________________
T: 21 2213   Hans Watts MD, [facility]       /EPI

## 2021-06-10 ENCOUNTER — HOSPITAL ENCOUNTER (EMERGENCY)
Dept: HOSPITAL 96 - M.ERS | Age: 56
Discharge: HOME | End: 2021-06-10
Payer: COMMERCIAL

## 2021-06-10 VITALS — BODY MASS INDEX: 32.14 KG/M2 | HEIGHT: 66 IN | WEIGHT: 200 LBS

## 2021-06-10 VITALS — SYSTOLIC BLOOD PRESSURE: 165 MMHG | DIASTOLIC BLOOD PRESSURE: 95 MMHG

## 2021-06-10 DIAGNOSIS — Z90.49: ICD-10-CM

## 2021-06-10 DIAGNOSIS — R11.2: ICD-10-CM

## 2021-06-10 DIAGNOSIS — Z76.0: ICD-10-CM

## 2021-06-10 DIAGNOSIS — M25.561: Primary | ICD-10-CM

## 2021-06-10 DIAGNOSIS — Z79.899: ICD-10-CM

## 2021-06-10 DIAGNOSIS — Z87.891: ICD-10-CM

## 2021-06-10 DIAGNOSIS — Z90.710: ICD-10-CM

## 2021-06-11 ENCOUNTER — HOSPITAL ENCOUNTER (EMERGENCY)
Dept: HOSPITAL 96 - M.ERS | Age: 56
Discharge: HOME | End: 2021-06-11
Payer: COMMERCIAL

## 2021-06-11 VITALS — SYSTOLIC BLOOD PRESSURE: 152 MMHG | DIASTOLIC BLOOD PRESSURE: 95 MMHG

## 2021-06-11 VITALS — HEIGHT: 66 IN | WEIGHT: 200 LBS | BODY MASS INDEX: 32.14 KG/M2

## 2021-06-11 DIAGNOSIS — R11.15: Primary | ICD-10-CM

## 2021-06-11 DIAGNOSIS — Z90.710: ICD-10-CM

## 2021-06-11 DIAGNOSIS — R10.84: ICD-10-CM

## 2021-06-11 DIAGNOSIS — Z90.49: ICD-10-CM

## 2021-06-11 DIAGNOSIS — Z79.899: ICD-10-CM

## 2021-06-11 DIAGNOSIS — Z98.51: ICD-10-CM

## 2021-06-11 LAB
ABSOLUTE MONOCYTES: 0.5 THOU/UL (ref 0–1.2)
ALBUMIN SERPL-MCNC: 3.7 G/DL (ref 3.4–5)
ALP SERPL-CCNC: 143 U/L (ref 46–116)
ALT SERPL-CCNC: 21 U/L (ref 30–65)
ANION GAP SERPL CALC-SCNC: 8 MMOL/L (ref 7–16)
AST SERPL-CCNC: 19 U/L (ref 15–37)
BACTERIA-REFLEX: (no result) /HPF
BILIRUB SERPL-MCNC: 0.5 MG/DL
BILIRUB UR-MCNC: NEGATIVE MG/DL
BUN SERPL-MCNC: 19 MG/DL (ref 7–18)
CALCIUM SERPL-MCNC: 8.9 MG/DL (ref 8.5–10.1)
CHLORIDE SERPL-SCNC: 101 MMOL/L (ref 98–107)
CO2 SERPL-SCNC: 28 MMOL/L (ref 21–32)
COLOR UR: YELLOW
CREAT SERPL-MCNC: 0.8 MG/DL (ref 0.6–1.3)
GLUCOSE SERPL-MCNC: 115 MG/DL (ref 70–99)
GRANULOCYTES NFR BLD MANUAL: 88 %
HCT VFR BLD CALC: 45.1 % (ref 37–47)
HGB BLD-MCNC: 15.2 GM/DL (ref 12–15)
KETONES UR STRIP-MCNC: NEGATIVE MG/DL
LIPASE: 299 U/L (ref 73–393)
LYMPHOCYTES # BLD: 1 THOU/UL (ref 0.8–5.3)
LYMPHOCYTES NFR BLD AUTO: 8 %
MCH RBC QN AUTO: 29.2 PG (ref 26–34)
MCHC RBC AUTO-ENTMCNC: 33.7 G/DL (ref 28–37)
MCV RBC: 86.7 FL (ref 80–100)
MONOCYTES NFR BLD: 4 %
MPV: 7.3 FL. (ref 7.2–11.1)
NEUTROPHILS # BLD: 11.2 THOU/UL (ref 1.6–8.1)
NUCLEATED RBCS: 0 /100WBC
PLATELET # BLD EST: ADEQUATE 10*3/UL
PLATELET COUNT*: 258 THOU/UL (ref 150–400)
POTASSIUM SERPL-SCNC: 3.5 MMOL/L (ref 3.5–5.1)
PROT SERPL-MCNC: 7.6 G/DL (ref 6.4–8.2)
PROT UR QL STRIP: (no result)
RBC # BLD AUTO: 5.2 MIL/UL (ref 4.2–5)
RBC # UR STRIP: (no result) /UL
RBC #/AREA URNS HPF: (no result) /HPF (ref 0–2)
RBC MORPH BLD: NORMAL
RDW-CV: 15.9 % (ref 10.5–14.5)
SODIUM SERPL-SCNC: 137 MMOL/L (ref 136–145)
SP GR UR STRIP: 1.01 (ref 1–1.03)
SQUAMOUS: (no result) /LPF (ref 0–3)
URINE CLARITY: CLEAR
URINE GLUCOSE-RANDOM: NEGATIVE
URINE LEUKOCYTES-REFLEX: (no result)
URINE NITRITE-REFLEX: NEGATIVE
URINE WBC-REFLEX: (no result) /HPF (ref 0–5)
UROBILINOGEN UR STRIP-ACNC: 1 E.U./DL (ref 0.2–1)
WBC # BLD AUTO: 12.7 THOU/UL (ref 4–11)

## 2021-06-17 ENCOUNTER — HOSPITAL ENCOUNTER (EMERGENCY)
Dept: HOSPITAL 96 - M.ERS | Age: 56
Discharge: LEFT BEFORE DISCHARGE | End: 2021-06-17
Payer: COMMERCIAL

## 2021-06-17 DIAGNOSIS — Z53.21: Primary | ICD-10-CM

## 2021-06-18 ENCOUNTER — HOSPITAL ENCOUNTER (EMERGENCY)
Dept: HOSPITAL 96 - M.ERS | Age: 56
Discharge: HOME | End: 2021-06-18
Payer: COMMERCIAL

## 2021-06-18 VITALS — DIASTOLIC BLOOD PRESSURE: 87 MMHG | SYSTOLIC BLOOD PRESSURE: 150 MMHG

## 2021-06-18 VITALS — BODY MASS INDEX: 30.53 KG/M2 | HEIGHT: 66 IN | WEIGHT: 189.99 LBS

## 2021-06-18 DIAGNOSIS — Z90.710: ICD-10-CM

## 2021-06-18 DIAGNOSIS — Z98.51: ICD-10-CM

## 2021-06-18 DIAGNOSIS — R10.84: ICD-10-CM

## 2021-06-18 DIAGNOSIS — J44.9: ICD-10-CM

## 2021-06-18 DIAGNOSIS — Z87.891: ICD-10-CM

## 2021-06-18 DIAGNOSIS — R10.13: ICD-10-CM

## 2021-06-18 DIAGNOSIS — G62.9: ICD-10-CM

## 2021-06-18 DIAGNOSIS — G25.81: ICD-10-CM

## 2021-06-18 DIAGNOSIS — R11.2: Primary | ICD-10-CM

## 2021-06-18 DIAGNOSIS — R10.33: ICD-10-CM

## 2021-06-18 DIAGNOSIS — Z96.652: ICD-10-CM

## 2021-06-18 DIAGNOSIS — Z85.118: ICD-10-CM

## 2021-06-18 DIAGNOSIS — Z90.49: ICD-10-CM

## 2021-06-18 LAB
ABSOLUTE MONOCYTES: 0.2 THOU/UL (ref 0–1.2)
ALBUMIN SERPL-MCNC: 3.4 G/DL (ref 3.4–5)
ALP SERPL-CCNC: 174 U/L (ref 46–116)
ALT SERPL-CCNC: 17 U/L (ref 30–65)
ANION GAP SERPL CALC-SCNC: 5 MMOL/L (ref 7–16)
ANISOCYTOSIS BLD QL SMEAR: (no result)
AST SERPL-CCNC: 12 U/L (ref 15–37)
BILIRUB SERPL-MCNC: < 0.1 MG/DL
BILIRUB UR-MCNC: NEGATIVE MG/DL
BUN SERPL-MCNC: 12 MG/DL (ref 7–18)
CALCIUM SERPL-MCNC: 9.4 MG/DL (ref 8.5–10.1)
CHLORIDE SERPL-SCNC: 104 MMOL/L (ref 98–107)
CO2 SERPL-SCNC: 33 MMOL/L (ref 21–32)
COLOR UR: YELLOW
CREAT SERPL-MCNC: 0.8 MG/DL (ref 0.6–1.3)
GLUCOSE SERPL-MCNC: 148 MG/DL (ref 70–99)
GRANULOCYTES NFR BLD MANUAL: 86 %
HCT VFR BLD CALC: 45 % (ref 37–47)
HGB BLD-MCNC: 15.2 GM/DL (ref 12–15)
KETONES UR STRIP-MCNC: (no result) MG/DL
LIPASE: 139 U/L (ref 73–393)
LYMPHOCYTES # BLD: 0.9 THOU/UL (ref 0.8–5.3)
LYMPHOCYTES NFR BLD AUTO: 12 %
MAGNESIUM SERPL-MCNC: 1.8 MG/DL (ref 1.8–2.4)
MCH RBC QN AUTO: 29.5 PG (ref 26–34)
MCHC RBC AUTO-ENTMCNC: 33.9 G/DL (ref 28–37)
MCV RBC: 86.8 FL (ref 80–100)
MONOCYTES NFR BLD: 2 %
MPV: 7.9 FL. (ref 7.2–11.1)
NEUTROPHILS # BLD: 6.7 THOU/UL (ref 1.6–8.1)
NUCLEATED RBCS: 0 /100WBC
PLATELET # BLD EST: ADEQUATE 10*3/UL
PLATELET COUNT*: 218 THOU/UL (ref 150–400)
POTASSIUM SERPL-SCNC: 3.5 MMOL/L (ref 3.5–5.1)
PROT SERPL-MCNC: 7.3 G/DL (ref 6.4–8.2)
PROT UR QL STRIP: (no result)
RBC # BLD AUTO: 5.18 MIL/UL (ref 4.2–5)
RBC # UR STRIP: (no result) /UL
RDW-CV: 17.1 % (ref 10.5–14.5)
SODIUM SERPL-SCNC: 142 MMOL/L (ref 136–145)
SP GR UR STRIP: 1.01 (ref 1–1.03)
URINE CLARITY: CLEAR
URINE GLUCOSE-RANDOM: NEGATIVE
URINE LEUKOCYTES-REFLEX: NEGATIVE
URINE NITRITE-REFLEX: NEGATIVE
UROBILINOGEN UR STRIP-ACNC: 0.2 E.U./DL (ref 0.2–1)
WBC # BLD AUTO: 7.8 THOU/UL (ref 4–11)

## 2021-06-21 ENCOUNTER — HOSPITAL ENCOUNTER (EMERGENCY)
Dept: HOSPITAL 96 - M.ERS | Age: 56
Discharge: HOME | End: 2021-06-21
Payer: COMMERCIAL

## 2021-06-21 VITALS — SYSTOLIC BLOOD PRESSURE: 199 MMHG | DIASTOLIC BLOOD PRESSURE: 106 MMHG

## 2021-06-21 VITALS — BODY MASS INDEX: 32.14 KG/M2 | HEIGHT: 66 IN | WEIGHT: 200 LBS

## 2021-06-21 DIAGNOSIS — Z96.652: ICD-10-CM

## 2021-06-21 DIAGNOSIS — Z90.49: ICD-10-CM

## 2021-06-21 DIAGNOSIS — G62.9: ICD-10-CM

## 2021-06-21 DIAGNOSIS — G25.81: ICD-10-CM

## 2021-06-21 DIAGNOSIS — R11.15: Primary | ICD-10-CM

## 2021-06-21 DIAGNOSIS — R19.7: ICD-10-CM

## 2021-06-21 DIAGNOSIS — R10.32: ICD-10-CM

## 2021-06-21 DIAGNOSIS — Z90.710: ICD-10-CM

## 2021-06-21 DIAGNOSIS — Z87.891: ICD-10-CM

## 2021-06-21 DIAGNOSIS — K31.84: ICD-10-CM

## 2021-06-21 DIAGNOSIS — J44.9: ICD-10-CM

## 2021-06-21 DIAGNOSIS — Z98.51: ICD-10-CM

## 2021-06-21 DIAGNOSIS — Z85.118: ICD-10-CM

## 2021-06-21 LAB
ABSOLUTE BASOPHILS: 0.1 THOU/UL (ref 0–0.2)
ABSOLUTE EOSINOPHILS: 0 THOU/UL (ref 0–0.7)
ABSOLUTE MONOCYTES: 0.3 THOU/UL (ref 0–1.2)
ALBUMIN SERPL-MCNC: 3.6 G/DL (ref 3.4–5)
ALP SERPL-CCNC: 157 U/L (ref 46–116)
ALT SERPL-CCNC: 19 U/L (ref 30–65)
ANION GAP SERPL CALC-SCNC: 7 MMOL/L (ref 7–16)
AST SERPL-CCNC: 11 U/L (ref 15–37)
BACTERIA-REFLEX: (no result) /HPF
BASOPHILS NFR BLD AUTO: 0.8 %
BILIRUB SERPL-MCNC: 0.3 MG/DL
BILIRUB UR-MCNC: NEGATIVE MG/DL
BUN SERPL-MCNC: 16 MG/DL (ref 7–18)
CALCIUM SERPL-MCNC: 10 MG/DL (ref 8.5–10.1)
CHLORIDE SERPL-SCNC: 103 MMOL/L (ref 98–107)
CO2 SERPL-SCNC: 31 MMOL/L (ref 21–32)
COLOR UR: YELLOW
CREAT SERPL-MCNC: 0.9 MG/DL (ref 0.6–1.3)
EOSINOPHIL NFR BLD: 0.4 %
GLUCOSE SERPL-MCNC: 134 MG/DL (ref 70–99)
GRANULOCYTES NFR BLD MANUAL: 84.4 %
HCT VFR BLD CALC: 45.8 % (ref 37–47)
HGB BLD-MCNC: 15.8 GM/DL (ref 12–15)
KETONES UR STRIP-MCNC: NEGATIVE MG/DL
LIPASE: 221 U/L (ref 73–393)
LYMPHOCYTES # BLD: 0.9 THOU/UL (ref 0.8–5.3)
LYMPHOCYTES NFR BLD AUTO: 10.9 %
MCH RBC QN AUTO: 29.7 PG (ref 26–34)
MCHC RBC AUTO-ENTMCNC: 34.5 G/DL (ref 28–37)
MCV RBC: 86.2 FL (ref 80–100)
MONOCYTES NFR BLD: 3.5 %
MPV: 7.8 FL. (ref 7.2–11.1)
NEUTROPHILS # BLD: 7 THOU/UL (ref 1.6–8.1)
NUCLEATED RBCS: 0 /100WBC
PLATELET COUNT*: 246 THOU/UL (ref 150–400)
POTASSIUM SERPL-SCNC: 4 MMOL/L (ref 3.5–5.1)
PROT SERPL-MCNC: 7.6 G/DL (ref 6.4–8.2)
PROT UR QL STRIP: NEGATIVE
RBC # BLD AUTO: 5.32 MIL/UL (ref 4.2–5)
RBC # UR STRIP: NEGATIVE /UL
RDW-CV: 17.4 % (ref 10.5–14.5)
SODIUM SERPL-SCNC: 141 MMOL/L (ref 136–145)
SP GR UR STRIP: 1.02 (ref 1–1.03)
SQUAMOUS: (no result) /LPF (ref 0–3)
URINE CLARITY: (no result)
URINE GLUCOSE-RANDOM: NEGATIVE
URINE LEUKOCYTES-REFLEX: NEGATIVE
URINE NITRITE-REFLEX: NEGATIVE
URINE WBC-REFLEX: (no result) /HPF (ref 0–5)
UROBILINOGEN UR STRIP-ACNC: 0.2 E.U./DL (ref 0.2–1)
WBC # BLD AUTO: 8.3 THOU/UL (ref 4–11)

## 2021-06-22 NOTE — EKG
[place] [address]
Phone:  [phone]                     ELECTROCARDIOGRAM REPORT      
_______________________________________________________________________________
 
Name:         SANTOS MCFARLANE                   Room:                     [facility]#:    [id number]     Account #:     [id number]  
Admission:    21    Attend Phys:                     
Discharge:    21    Date of Birth: [birth year]  
Date of Service: 21  Report #:      [id number]
        [id number]
_______________________________________________________________________________
THIS REPORT FOR:  //name//                      
 
                         [facility] ED
                                       
Test Date:    2021               Test Time:    19:27:02
Pat Name:     SANTOS MCFARLANE                Department:   
Patient ID:   [id number]            Room:          
Gender:                               Technician:   
:          1965               Requested By: Jessica Hurst
Order Number: [id number] MD:   Hans Watts
                                 Measurements
Intervals                              Axis          
Rate:         105                      P:            51
[place]:           169                      QRS:          -46
QRSD:         91                       T:            [birth year]
QT:           353                                    
QTc:          467                                    
                           Interpretive Statements
Sinus tachycardia
Consider right atrial enlargement
Left anterior fascicular block
Abnormal R-wave progression, early transition
Artifact in lead(s) I,II,III,aVR,aVL,aVF
Compared to ECG 2021 22:13:26
Left anterior fascicular block persists
Electronically Signed On 2021 11:06:04 CDT by Hans Watts
[email or web address]
 
 
 
 
 
 
 
 
 
 
 
 
 
 
 
 
 
  <ELECTRONICALLY SIGNED>
                                           By: Hans Watts MD, [facility]     
  21     1106
D: 21   _____________________________________
T: 21   Hans Watts MD, [facility]       /EPI

## 2021-06-23 ENCOUNTER — HOSPITAL ENCOUNTER (EMERGENCY)
Dept: HOSPITAL 96 - M.ERS | Age: 56
Discharge: HOME | End: 2021-06-23
Payer: COMMERCIAL

## 2021-06-23 VITALS — HEIGHT: 66 IN | BODY MASS INDEX: 32.14 KG/M2 | WEIGHT: 200 LBS

## 2021-06-23 VITALS — SYSTOLIC BLOOD PRESSURE: 144 MMHG | DIASTOLIC BLOOD PRESSURE: 76 MMHG

## 2021-06-23 DIAGNOSIS — F17.210: ICD-10-CM

## 2021-06-23 DIAGNOSIS — Z98.51: ICD-10-CM

## 2021-06-23 DIAGNOSIS — Z90.710: ICD-10-CM

## 2021-06-23 DIAGNOSIS — R11.2: Primary | ICD-10-CM

## 2021-06-23 DIAGNOSIS — R19.7: ICD-10-CM

## 2021-06-23 LAB
ABSOLUTE BASOPHILS: 0.1 THOU/UL (ref 0–0.2)
ABSOLUTE EOSINOPHILS: 0 THOU/UL (ref 0–0.7)
ABSOLUTE MONOCYTES: 0.6 THOU/UL (ref 0–1.2)
ALBUMIN SERPL-MCNC: 3.8 G/DL (ref 3.4–5)
ALP SERPL-CCNC: 207 U/L (ref 46–116)
ALT SERPL-CCNC: 27 U/L (ref 30–65)
ANION GAP SERPL CALC-SCNC: 9 MMOL/L (ref 7–16)
AST SERPL-CCNC: 18 U/L (ref 15–37)
BASOPHILS NFR BLD AUTO: 0.9 %
BILIRUB SERPL-MCNC: 0.3 MG/DL
BUN SERPL-MCNC: 10 MG/DL (ref 7–18)
CALCIUM SERPL-MCNC: 9.3 MG/DL (ref 8.5–10.1)
CHLORIDE SERPL-SCNC: 103 MMOL/L (ref 98–107)
CO2 SERPL-SCNC: 27 MMOL/L (ref 21–32)
CREAT SERPL-MCNC: 0.9 MG/DL (ref 0.6–1.3)
EOSINOPHIL NFR BLD: 0.3 %
GLUCOSE SERPL-MCNC: 149 MG/DL (ref 70–99)
GRANULOCYTES NFR BLD MANUAL: 85.4 %
HCT VFR BLD CALC: 45.6 % (ref 37–47)
HGB BLD-MCNC: 15.6 GM/DL (ref 12–15)
LYMPHOCYTES # BLD: 0.9 THOU/UL (ref 0.8–5.3)
LYMPHOCYTES NFR BLD AUTO: 8 %
MCH RBC QN AUTO: 29.6 PG (ref 26–34)
MCHC RBC AUTO-ENTMCNC: 34.3 G/DL (ref 28–37)
MCV RBC: 86.3 FL (ref 80–100)
MONOCYTES NFR BLD: 5.4 %
MPV: 7.4 FL. (ref 7.2–11.1)
NEUTROPHILS # BLD: 9.4 THOU/UL (ref 1.6–8.1)
NUCLEATED RBCS: 0 /100WBC
PLATELET COUNT*: 250 THOU/UL (ref 150–400)
POTASSIUM SERPL-SCNC: 3.6 MMOL/L (ref 3.5–5.1)
PROT SERPL-MCNC: 7.9 G/DL (ref 6.4–8.2)
RBC # BLD AUTO: 5.29 MIL/UL (ref 4.2–5)
RDW-CV: 17.3 % (ref 10.5–14.5)
SODIUM SERPL-SCNC: 139 MMOL/L (ref 136–145)
WBC # BLD AUTO: 11 THOU/UL (ref 4–11)

## 2021-07-14 ENCOUNTER — HOSPITAL ENCOUNTER (EMERGENCY)
Dept: HOSPITAL 96 - M.ERS | Age: 56
Discharge: HOME | End: 2021-07-14
Payer: COMMERCIAL

## 2021-07-14 VITALS — HEIGHT: 62 IN | WEIGHT: 160.01 LBS | BODY MASS INDEX: 29.45 KG/M2

## 2021-07-14 VITALS — SYSTOLIC BLOOD PRESSURE: 156 MMHG | DIASTOLIC BLOOD PRESSURE: 99 MMHG

## 2021-07-14 DIAGNOSIS — Z98.51: ICD-10-CM

## 2021-07-14 DIAGNOSIS — Z85.118: ICD-10-CM

## 2021-07-14 DIAGNOSIS — Z79.899: ICD-10-CM

## 2021-07-14 DIAGNOSIS — Z90.710: ICD-10-CM

## 2021-07-14 DIAGNOSIS — R10.32: Primary | ICD-10-CM

## 2021-07-14 DIAGNOSIS — J44.9: ICD-10-CM

## 2021-07-14 DIAGNOSIS — F17.210: ICD-10-CM

## 2021-07-14 DIAGNOSIS — R11.2: ICD-10-CM

## 2021-07-14 DIAGNOSIS — K31.84: ICD-10-CM

## 2021-07-14 DIAGNOSIS — Z90.49: ICD-10-CM

## 2021-07-14 LAB
ABSOLUTE MONOCYTES: 0.2 THOU/UL (ref 0–1.2)
ALBUMIN SERPL-MCNC: 3.7 G/DL (ref 3.4–5)
ALP SERPL-CCNC: 135 U/L (ref 46–116)
ALT SERPL-CCNC: 13 U/L (ref 30–65)
ANION GAP SERPL CALC-SCNC: 10 MMOL/L (ref 7–16)
AST SERPL-CCNC: 9 U/L (ref 15–37)
BILIRUB SERPL-MCNC: 0.3 MG/DL
BILIRUB UR-MCNC: NEGATIVE MG/DL
BUN SERPL-MCNC: 14 MG/DL (ref 7–18)
CALCIUM SERPL-MCNC: 9.5 MG/DL (ref 8.5–10.1)
CHLORIDE SERPL-SCNC: 102 MMOL/L (ref 98–107)
CO2 SERPL-SCNC: 29 MMOL/L (ref 21–32)
COLOR UR: YELLOW
CREAT SERPL-MCNC: 1.2 MG/DL (ref 0.6–1.3)
GLUCOSE SERPL-MCNC: 176 MG/DL (ref 70–99)
GRANULOCYTES NFR BLD MANUAL: 95 %
HCT VFR BLD CALC: 44.1 % (ref 37–47)
HGB BLD-MCNC: 15.3 GM/DL (ref 12–15)
KETONES UR STRIP-MCNC: NEGATIVE MG/DL
LIPASE: 91 U/L (ref 73–393)
LYMPHOCYTES # BLD: 0.3 THOU/UL (ref 0.8–5.3)
LYMPHOCYTES NFR BLD AUTO: 3 %
MCH RBC QN AUTO: 30 PG (ref 26–34)
MCHC RBC AUTO-ENTMCNC: 34.7 G/DL (ref 28–37)
MCV RBC: 86.5 FL (ref 80–100)
MONOCYTES NFR BLD: 2 %
MPV: 7.5 FL. (ref 7.2–11.1)
NEUTROPHILS # BLD: 9.1 THOU/UL (ref 1.6–8.1)
NUCLEATED RBCS: 0 /100WBC
PLATELET # BLD EST: ADEQUATE 10*3/UL
PLATELET COUNT*: 234 THOU/UL (ref 150–400)
POTASSIUM SERPL-SCNC: 3.7 MMOL/L (ref 3.5–5.1)
PROT SERPL-MCNC: 7.6 G/DL (ref 6.4–8.2)
PROT UR QL STRIP: (no result)
RBC # BLD AUTO: 5.09 MIL/UL (ref 4.2–5)
RBC # UR STRIP: (no result) /UL
RBC MORPH BLD: NORMAL
RDW-CV: 19.5 % (ref 10.5–14.5)
SODIUM SERPL-SCNC: 141 MMOL/L (ref 136–145)
SP GR UR STRIP: 1.01 (ref 1–1.03)
URINE CLARITY: CLEAR
URINE GLUCOSE-RANDOM: NEGATIVE
URINE LEUKOCYTES-REFLEX: NEGATIVE
URINE NITRITE-REFLEX: NEGATIVE
UROBILINOGEN UR STRIP-ACNC: 0.2 E.U./DL (ref 0.2–1)
WBC # BLD AUTO: 9.6 THOU/UL (ref 4–11)

## 2021-07-14 NOTE — EKG
[place] [address]
Phone:  [phone]                     ELECTROCARDIOGRAM REPORT      
_______________________________________________________________________________
 
Name:         MAEVE,GIA MITZI                   Room:                     [facility]#:    [id number]     Account #:     [id number]  
Admission:    21    Attend Phys:                     
Discharge:    21    Date of Birth: [birth year]  
Date of Service: 21  Report #:      [id number]
        [id number]
_______________________________________________________________________________
THIS REPORT FOR:  //name//                      
 
                         [facility] ED
                                       
Test Date:    2021               Test Time:    09:18:42
Pat Name:     SANTOS MCFARLANE                Department:   
Patient ID:   [id number]            Room:          
Gender:                               Technician:   ANGEL
:          1965               Requested By: Rogelio Fields
Order Number: [id number]    Yuki MD:   Hans Watts
                                 Measurements
Intervals                              Axis          
Rate:         119                      P:            39
SD:           150                      QRS:          -33
QRSD:         95                       T:            85
QT:           329                                    
QTc:          463                                    
                           Interpretive Statements
Pacemaker spikes or artifacts
Sinus tachycardia
Probable left atrial enlargement
Left axis deviation
RSR' in V1 or V2, probably normal variant
Borderline ST depression, lateral leads
Artifact in lead(s) I,II,III,aVR,aVL,V1,V2,V3,V4,V5,V6
Compared to ECG 2021 09:17:0
No significant interval change
Electronically Signed On 2021 16:33:52 CDT by Hans Watts
[email or web address]
 
 
 
 
 
 
 
 
 
 
 
 
 
 
 
  <ELECTRONICALLY SIGNED>
                                           By: Hans Watts MD, [facility]     
  21     1633
D: 21   _____________________________________
T: 21   Hans Watts MD, [facility]       /EPI

## 2021-07-14 NOTE — EKG
[place] [address]
Phone:  [phone]                     ELECTROCARDIOGRAM REPORT      
_______________________________________________________________________________
 
Name:         MAEVE,SANTOS A                   Room:                     [facility]#:    [id number]     Account #:     [id number]  
Admission:    21    Attend Phys:                     
Discharge:    21    Date of Birth: [birth year]  
Date of Service: 21 0917  Report #:      [id number]
        [id number]
_______________________________________________________________________________
THIS REPORT FOR:  //name//                      
 
                         [facility] ED
                                       
Test Date:    2021               Test Time:    09:17:08
Pat Name:     SANTOS MCFARLANE                Department:   
Patient ID:   [id number]            Room:          
Gender:                               Technician:   ANGEL
:          1965               Requested By: Rogelio Fields
Order Number: [id number] MD:   Hans Watts
                                 Measurements
Intervals                              Axis          
Rate:         120                      P:            54
IL:           154                      QRS:          -37
QRSD:         94                       T:            81
QT:           329                                    
QTc:          465                                    
                           Interpretive Statements
Pacemaker spikes or artifacts
Sinus tachycardia
Probable left atrial enlargement
Left axis deviation
RSR' in V1 or V2, right VCD
Artifact in lead(s) I,II,III,aVL,V1,V3,V4,V5,V6
Compared to ECG 2021 19:27:02
Left-axis deviation persists
RSR' in V1 or V2 now present
Electronically Signed On 2021 16:33:06 CDT by Hans Watts
[email or web address]
 
 
 
 
 
 
 
 
 
 
 
 
 
 
 
  <ELECTRONICALLY SIGNED>
                                           By: Hans Watts MD, [facility]     
  21     1633
D: 21   _____________________________________
T: 21   Hans Watts MD, [facility]       /EPI

## 2021-08-07 ENCOUNTER — HOSPITAL ENCOUNTER (EMERGENCY)
Dept: HOSPITAL 96 - M.ERS | Age: 56
LOS: 1 days | Discharge: HOME | End: 2021-08-08
Payer: COMMERCIAL

## 2021-08-07 VITALS — HEIGHT: 66 IN | BODY MASS INDEX: 32.14 KG/M2 | WEIGHT: 200 LBS

## 2021-08-07 DIAGNOSIS — Z87.891: ICD-10-CM

## 2021-08-07 DIAGNOSIS — K31.84: ICD-10-CM

## 2021-08-07 DIAGNOSIS — Z79.899: ICD-10-CM

## 2021-08-07 DIAGNOSIS — F41.9: ICD-10-CM

## 2021-08-07 DIAGNOSIS — Z90.49: ICD-10-CM

## 2021-08-07 DIAGNOSIS — R11.2: ICD-10-CM

## 2021-08-07 DIAGNOSIS — F32.9: ICD-10-CM

## 2021-08-07 DIAGNOSIS — Z98.51: ICD-10-CM

## 2021-08-07 DIAGNOSIS — Z90.710: ICD-10-CM

## 2021-08-07 DIAGNOSIS — G25.81: ICD-10-CM

## 2021-08-07 DIAGNOSIS — R10.84: Primary | ICD-10-CM

## 2021-08-07 DIAGNOSIS — J44.9: ICD-10-CM

## 2021-08-08 VITALS — SYSTOLIC BLOOD PRESSURE: 109 MMHG | DIASTOLIC BLOOD PRESSURE: 69 MMHG

## 2021-08-08 LAB
ANION GAP SERPL CALC-SCNC: 10 MMOL/L (ref 7–16)
BUN SERPL-MCNC: 11 MG/DL (ref 7–18)
CALCIUM SERPL-MCNC: 9.2 MG/DL (ref 8.5–10.1)
CHLORIDE SERPL-SCNC: 103 MMOL/L (ref 98–107)
CO2 SERPL-SCNC: 28 MMOL/L (ref 21–32)
CREAT SERPL-MCNC: 1 MG/DL (ref 0.6–1.3)
GLUCOSE SERPL-MCNC: 167 MG/DL (ref 70–99)
HCT VFR BLD CALC: 46.3 % (ref 37–47)
HGB BLD-MCNC: 15.3 GM/DL (ref 12–15)
LIPASE: 89 U/L (ref 73–393)
MCH RBC QN AUTO: 29.8 PG (ref 26–34)
MCHC RBC AUTO-ENTMCNC: 33.2 G/DL (ref 28–37)
MCV RBC: 89.7 FL (ref 80–100)
MPV: 8 FL. (ref 7.2–11.1)
PLATELET COUNT*: 203 THOU/UL (ref 150–400)
POTASSIUM SERPL-SCNC: 3.7 MMOL/L (ref 3.5–5.1)
RBC # BLD AUTO: 5.16 MIL/UL (ref 4.2–5)
RDW-CV: 20.8 % (ref 10.5–14.5)
SODIUM SERPL-SCNC: 141 MMOL/L (ref 136–145)
WBC # BLD AUTO: 8.3 THOU/UL (ref 4–11)

## 2021-08-08 NOTE — EKG
[place] [address]
Phone:  [phone]                     ELECTROCARDIOGRAM REPORT      
_______________________________________________________________________________
 
Name:         SANTOS MCFARLANE                   Room:                     [facility]#:    [id number]     Account #:     [id number]  
Admission:    21    Attend Phys:                     
Discharge:    21    Date of Birth: [birth year]  
Date of Service: 21  Report #:      [id number]
        [id number]
_______________________________________________________________________________
THIS REPORT FOR:  //name//                      
 
                         [facility] ED
                                       
Test Date:    2021               Test Time:    22:20:24
Pat Name:     SANTOS MCFARLANE                Department:   
Patient ID:   [id number]            Room:          
Gender:                               Technician:   FL
:          1965               Requested By: Carrie Mclean
Order Number: [id number] MD:   South Shi
                                 Measurements
Intervals                              Axis          
Rate:         125                      P:            49
NC:           149                      QRS:          -28
QRSD:         88                       T:            86
QT:           329                                    
QTc:          475                                    
                           Interpretive Statements
Sinus tachycardia
Borderline left axis deviation
RSR' in V1 or V2, right VCD or RVH
Compared to ECG 2021 09:18:42
 
no change
Electronically Signed On 2021 9:19:33 CDT by South Shi
[email or web address]
 
 
 
 
 
 
 
 
 
 
 
 
 
 
 
 
 
 
  <ELECTRONICALLY SIGNED>
                                           By: South Shi MD, [facility]      
  21     0919
D: 21 2220   _____________________________________
T: 21   South Shi MD, [facility]        /EPI

## 2021-09-28 ENCOUNTER — HOSPITAL ENCOUNTER (EMERGENCY)
Dept: HOSPITAL 96 - M.ERS | Age: 56
Discharge: HOME | End: 2021-09-28
Payer: COMMERCIAL

## 2021-09-28 VITALS — WEIGHT: 200 LBS | BODY MASS INDEX: 32.14 KG/M2 | HEIGHT: 66 IN

## 2021-09-28 VITALS — SYSTOLIC BLOOD PRESSURE: 174 MMHG | DIASTOLIC BLOOD PRESSURE: 103 MMHG

## 2021-09-28 DIAGNOSIS — G62.9: ICD-10-CM

## 2021-09-28 DIAGNOSIS — Z90.711: ICD-10-CM

## 2021-09-28 DIAGNOSIS — Z87.891: ICD-10-CM

## 2021-09-28 DIAGNOSIS — Z98.51: ICD-10-CM

## 2021-09-28 DIAGNOSIS — R11.2: Primary | ICD-10-CM

## 2021-09-28 DIAGNOSIS — Z90.49: ICD-10-CM

## 2021-09-28 DIAGNOSIS — Z96.652: ICD-10-CM

## 2021-09-28 DIAGNOSIS — F32.9: ICD-10-CM

## 2021-09-28 DIAGNOSIS — R25.1: ICD-10-CM

## 2021-09-28 DIAGNOSIS — F41.9: ICD-10-CM

## 2021-09-28 DIAGNOSIS — R10.13: ICD-10-CM

## 2021-09-28 DIAGNOSIS — Z79.899: ICD-10-CM

## 2021-09-28 DIAGNOSIS — J44.9: ICD-10-CM

## 2021-10-24 ENCOUNTER — HOSPITAL ENCOUNTER (EMERGENCY)
Dept: HOSPITAL 96 - M.ERS | Age: 56
Discharge: HOME | End: 2021-10-24
Payer: COMMERCIAL

## 2021-10-24 VITALS — WEIGHT: 185.01 LBS | HEIGHT: 66 IN | BODY MASS INDEX: 29.73 KG/M2

## 2021-10-24 VITALS — SYSTOLIC BLOOD PRESSURE: 141 MMHG | DIASTOLIC BLOOD PRESSURE: 70 MMHG

## 2021-10-24 DIAGNOSIS — Z90.49: ICD-10-CM

## 2021-10-24 DIAGNOSIS — Z90.711: ICD-10-CM

## 2021-10-24 DIAGNOSIS — Z96.652: ICD-10-CM

## 2021-10-24 DIAGNOSIS — R10.13: ICD-10-CM

## 2021-10-24 DIAGNOSIS — Z98.51: ICD-10-CM

## 2021-10-24 DIAGNOSIS — Z85.118: ICD-10-CM

## 2021-10-24 DIAGNOSIS — Z79.899: ICD-10-CM

## 2021-10-24 DIAGNOSIS — Z87.891: ICD-10-CM

## 2021-10-24 DIAGNOSIS — F41.0: ICD-10-CM

## 2021-10-24 DIAGNOSIS — J44.9: ICD-10-CM

## 2021-10-24 DIAGNOSIS — G62.9: ICD-10-CM

## 2021-10-24 DIAGNOSIS — Z98.890: ICD-10-CM

## 2021-10-24 DIAGNOSIS — R11.2: Primary | ICD-10-CM

## 2021-10-24 LAB
ABSOLUTE BASOPHILS: 0.1 THOU/UL (ref 0–0.2)
ABSOLUTE EOSINOPHILS: 0 THOU/UL (ref 0–0.7)
ABSOLUTE MONOCYTES: 0.4 THOU/UL (ref 0–1.2)
ALBUMIN SERPL-MCNC: 3.6 G/DL (ref 3.4–5)
ALP SERPL-CCNC: 133 U/L (ref 46–116)
ALT SERPL-CCNC: 12 U/L (ref 30–65)
ANION GAP SERPL CALC-SCNC: 7 MMOL/L (ref 7–16)
AST SERPL-CCNC: 9 U/L (ref 15–37)
BASOPHILS NFR BLD AUTO: 0.7 %
BILIRUB SERPL-MCNC: 0.3 MG/DL
BILIRUB UR-MCNC: NEGATIVE MG/DL
BUN SERPL-MCNC: 11 MG/DL (ref 7–18)
CALCIUM SERPL-MCNC: 9.4 MG/DL (ref 8.5–10.1)
CHLORIDE SERPL-SCNC: 100 MMOL/L (ref 98–107)
CO2 SERPL-SCNC: 31 MMOL/L (ref 21–32)
COLOR UR: YELLOW
CREAT SERPL-MCNC: 0.8 MG/DL (ref 0.6–1.3)
EOSINOPHIL NFR BLD: 0 %
GLUCOSE SERPL-MCNC: 126 MG/DL (ref 70–99)
GRANULOCYTES NFR BLD MANUAL: 82.7 %
HCT VFR BLD CALC: 42.2 % (ref 37–47)
HGB BLD-MCNC: 14.2 GM/DL (ref 12–15)
KETONES UR STRIP-MCNC: NEGATIVE MG/DL
LIPASE: 103 U/L (ref 73–393)
LYMPHOCYTES # BLD: 0.8 THOU/UL (ref 0.8–5.3)
LYMPHOCYTES NFR BLD AUTO: 10.8 %
MCH RBC QN AUTO: 31.9 PG (ref 26–34)
MCHC RBC AUTO-ENTMCNC: 33.7 G/DL (ref 28–37)
MCV RBC: 94.6 FL (ref 80–100)
MONOCYTES NFR BLD: 5.8 %
MPV: 7.3 FL. (ref 7.2–11.1)
NEUTROPHILS # BLD: 5.9 THOU/UL (ref 1.6–8.1)
NUCLEATED RBCS: 0 /100WBC
PLATELET COUNT*: 220 THOU/UL (ref 150–400)
POTASSIUM SERPL-SCNC: 4.4 MMOL/L (ref 3.5–5.1)
PROT SERPL-MCNC: 7.1 G/DL (ref 6.4–8.2)
PROT UR QL STRIP: NEGATIVE
RBC # BLD AUTO: 4.46 MIL/UL (ref 4.2–5)
RBC # UR STRIP: NEGATIVE /UL
RDW-CV: 14.4 % (ref 10.5–14.5)
SODIUM SERPL-SCNC: 138 MMOL/L (ref 136–145)
SP GR UR STRIP: 1.02 (ref 1–1.03)
URINE CLARITY: CLEAR
URINE GLUCOSE-RANDOM: NEGATIVE
URINE LEUKOCYTES-REFLEX: NEGATIVE
URINE NITRITE-REFLEX: NEGATIVE
UROBILINOGEN UR STRIP-ACNC: 0.2 E.U./DL (ref 0.2–1)
WBC # BLD AUTO: 7.1 THOU/UL (ref 4–11)

## 2021-10-24 NOTE — EKG
[place] [address]
Phone:  [phone]                     ELECTROCARDIOGRAM REPORT      
_______________________________________________________________________________
 
Name:         SANTOS MCFARLANE                   Room:                     [facility] 
M.R.#:    [id number]     Account #:     [id number]  
Admission:    10/24/21    Attend Phys:                     
Discharge:                Date of Birth: [birth year]  
Date of Service: 10/24/21 0754  Report #:      [id number]
        [id number]
_______________________________________________________________________________
THIS REPORT FOR:  //name//                      
 
                         [facility] ED
                                       
Test Date:    2021-10-24               Test Time:    07:54:36
Pat Name:     SANTOS MCFARLANE                Department:   
Patient ID:   [id number]            Room:          
Gender:                               Technician:   
:          1965               Requested By: Rogelio Fields
Order Number: [id number] MD:   Norris Louie
                                 Measurements
Intervals                              Axis          
Rate:         114                      P:            37
RI:           178                      QRS:          -23
QRSD:         88                       T:            70
QT:           323                                    
QTc:          445                                    
                           Interpretive Statements
Sinus tachycardia
Probable left atrial enlargement
Borderline left axis deviation
Abnormal R-wave progression, early transition
Compared to ECG 2021 22:20:24
Right ventricular hypertrophy no longer present
Electronically Signed On 10- 10:05:42 CDT by Norris Louie
[email or web address]
 
 
 
 
 
 
 
 
 
 
 
 
 
 
 
 
 
 
  <ELECTRONICALLY SIGNED>
                                           By: JENNIFER Louie MD, [facility]    
  10/24/21     1005
D: 10/24/21 0754   _____________________________________
T: 10/24/21 0754   JENNIFER Louie MD, [facility]      /EPI